# Patient Record
Sex: MALE | Race: WHITE | Employment: OTHER | URBAN - METROPOLITAN AREA
[De-identification: names, ages, dates, MRNs, and addresses within clinical notes are randomized per-mention and may not be internally consistent; named-entity substitution may affect disease eponyms.]

---

## 2017-03-22 PROBLEM — Z78.9 ADVANCE DIRECTIVE IN CHART: Status: ACTIVE | Noted: 2017-03-22

## 2017-05-22 ENCOUNTER — OFFICE VISIT (OUTPATIENT)
Dept: INTERNAL MEDICINE CLINIC | Age: 67
End: 2017-05-22

## 2017-05-22 VITALS
WEIGHT: 187 LBS | OXYGEN SATURATION: 97 % | HEART RATE: 76 BPM | RESPIRATION RATE: 18 BRPM | DIASTOLIC BLOOD PRESSURE: 80 MMHG | BODY MASS INDEX: 26.77 KG/M2 | SYSTOLIC BLOOD PRESSURE: 136 MMHG | TEMPERATURE: 98.6 F | HEIGHT: 70 IN

## 2017-05-22 DIAGNOSIS — Z91.09 ENVIRONMENTAL ALLERGIES: Primary | ICD-10-CM

## 2017-05-22 DIAGNOSIS — J30.9 ALLERGIC RHINITIS, UNSPECIFIED ALLERGIC RHINITIS TRIGGER, UNSPECIFIED RHINITIS SEASONALITY: ICD-10-CM

## 2017-05-22 RX ORDER — FLUTICASONE PROPIONATE 50 MCG
2 SPRAY, SUSPENSION (ML) NASAL DAILY
Qty: 1 BOTTLE | Refills: 1 | Status: SHIPPED | OUTPATIENT
Start: 2017-05-22

## 2017-05-22 RX ORDER — MELOXICAM 7.5 MG/1
TABLET ORAL
COMMUNITY
End: 2021-01-18 | Stop reason: SDUPTHER

## 2017-05-22 NOTE — PATIENT INSTRUCTIONS

## 2017-05-22 NOTE — PROGRESS NOTES
HISTORY OF PRESENT ILLNESS  Sterling Howell is a 78 y.o. male. Patient reports clear nasal drainage with dry cough for 3 days. Patient states he had similar symptoms about 1 month ago and went away with Benadryl. He is unsure if this is allergies or a cold. He denies fever, sore throat, body aches, fatigue. He states he works in the yard a lot and rides a motorcycle. Visit Vitals    /80 (BP 1 Location: Left arm, BP Patient Position: Sitting)    Pulse 76    Temp 98.6 °F (37 °C) (Oral)    Resp 18    Ht 5' 10\" (1.778 m)    Wt 187 lb (84.8 kg)    SpO2 97%    BMI 26.83 kg/m2       Allergies   The history is provided by the patient. This is a new problem. Episode onset: 3 days. The problem occurs constantly. The problem has not changed since onset. He has tried Benadryl for the symptoms. The treatment provided mild relief. Review of Systems   Constitutional: Negative for chills, fever and malaise/fatigue. HENT: Positive for congestion (clear). Respiratory: Positive for cough (at times, dry). Physical Exam   Constitutional: He is oriented to person, place, and time. He appears well-developed and well-nourished. HENT:   Head: Normocephalic. Right Ear: Hearing, tympanic membrane, external ear and ear canal normal.   Left Ear: Hearing, tympanic membrane, external ear and ear canal normal.   Nose: Mucosal edema and rhinorrhea (clear) present. Mouth/Throat: Uvula is midline, oropharynx is clear and moist and mucous membranes are normal.   Neck: Normal range of motion. Neck supple. Cardiovascular: Normal rate, regular rhythm and normal heart sounds. Pulmonary/Chest: Effort normal and breath sounds normal.   Lymphadenopathy:     He has no cervical adenopathy. Neurological: He is alert and oriented to person, place, and time. Skin: Skin is warm and dry. Psychiatric: He has a normal mood and affect. ASSESSMENT and PLAN    ICD-10-CM ICD-9-CM    1.  Environmental allergies Z91.09 V15.09    2.  Allergic rhinitis, unspecified allergic rhinitis trigger, unspecified rhinitis seasonality J30.9 477.9      Orders Placed This Encounter    meloxicam (MOBIC) 7.5 mg tablet    fluticasone (FLONASE) 50 mcg/actuation nasal spray   start OTC zyrtec and flonase as directed

## 2017-05-22 NOTE — PROGRESS NOTES
Chief Complaint   Patient presents with    Croup     x 3 days     Reviewed record in preparation for visit and have obtained necessary documentation. Identified pt with two pt identifiers(name and ). There are no preventive care reminders to display for this patient. Chief Complaint   Patient presents with    Croup     x 3 days        Wt Readings from Last 3 Encounters:   17 187 lb (84.8 kg)   16 180 lb (81.6 kg)   09/15/16 177 lb (80.3 kg)     Temp Readings from Last 3 Encounters:   17 98.6 °F (37 °C) (Oral)   16 98.1 °F (36.7 °C) (Oral)   09/15/16 98.5 °F (36.9 °C) (Oral)     BP Readings from Last 3 Encounters:   17 136/80   16 140/80   09/15/16 131/77     Pulse Readings from Last 3 Encounters:   17 76   16 63   09/15/16 66           Learning Assessment:  :     Learning Assessment 10/10/2014   PRIMARY LEARNER Patient   HIGHEST LEVEL OF EDUCATION - PRIMARY LEARNER  SOME COLLEGE   BARRIERS PRIMARY LEARNER NONE   CO-LEARNER CAREGIVER No   PRIMARY LANGUAGE ENGLISH    NEED No   LEARNER PREFERENCE PRIMARY OTHER (COMMENT)   LEARNING SPECIAL TOPICS no   ANSWERED BY patient   RELATIONSHIP SELF       Depression Screening:  :     PHQ over the last two weeks 2016   Little interest or pleasure in doing things Not at all   Feeling down, depressed or hopeless Not at all   Total Score PHQ 2 0       Fall Risk Assessment:  :     Fall Risk Assessment, last 12 mths 2016   Able to walk? Yes   Fall in past 12 months? No       Abuse Screening:  :     Abuse Screening Questionnaire 10/10/2014   Do you ever feel afraid of your partner? N   Are you in a relationship with someone who physically or mentally threatens you? N   Is it safe for you to go home?  Y       Coordination of Care Questionnaire:  :     1) Have you been to an emergency room, urgent care clinic since your last visit? no   Hospitalized since your last visit? no             2) Have you seen or consulted any other health care providers outside of [address] [place] [address] since your last visit? no  (Include any pap smears or colon screenings in this section.)    3) Do you have an Advance Directive on file? yes    4) Are you interested in receiving information on Advance Directives? NO      Patient is accompanied by self I have received verbal consent from Ailin Infante to discuss any/all medical information while they are present in the room. Reviewed record  In preparation for visit and have obtained necessary documentation.

## 2017-05-22 NOTE — MR AVS SNAPSHOT
Visit Information Date & Time Provider Department Dept. Phone Encounter #  
 5/22/2017 11:15 AM LYNNETTE [facility] [phone] [id number] Your Appointments 12/29/2017  8:20 AM  
COMPLETE 40 with MD Conner Solor[facility] ([facility]) Appt Note: [id number] [facility], Liliane Sanna [address] [place] [address]  
[address], [place]iane Sanna [address] [phone] Upcoming Health Maintenance Date Due INFLUENZA AGE 9 TO ADULT 8/1/2017 MEDICARE YEARLY EXAM 12/30/2017 GLAUCOMA SCREENING Q2Y 4/1/2018 COLONOSCOPY 12/13/2022 DTaP/Tdap/Td series (2 - Td) 1/1/2023 Allergies as of 5/22/2017  Review Complete On: 5/22/2017 By: India Chaudhry NP Severity Noted Reaction Type Reactions Acetaminophen  12/16/2011    Unknown (comments) Does not take because of liver issue Current Immunizations  Reviewed on 12/29/2016 Name Date Influenza High Dose Vaccine PF 10/5/2016 Influenza Vaccine 10/24/2014, 10/19/2013 Influenza Vaccine Whole 10/15/2012 Pneumococcal Conjugate (PCV-13) 1/1/2014 Pneumococcal Vaccine (Unspecified Type) 11/1/2011 TD Vaccine 11/1/2011 Not reviewed this visit You Were Diagnosed With   
  
 Codes Comments Environmental allergies    -  Primary ICD-10-CM: Z91.09 
ICD-9-CM: V15.09 Allergic rhinitis, unspecified allergic rhinitis trigger, unspecified rhinitis seasonality     ICD-10-CM: J30.9 ICD-9-CM: 477.9 Vitals BP Pulse Temp Resp Height(growth percentile) Weight(growth percentile) 136/80 (BP 1 Location: Left arm, BP Patient Position: Sitting) 76 98.6 °F (37 °C) (Oral) 18 5' 10\" (1.778 m) 187 lb (84.8 kg) SpO2 BMI Smoking Status 97% 26.83 kg/m2 Former Smoker BMI and BSA Data Body Mass Index Body Surface Area  
 26.83 kg/m 2 2.05 m 2 Preferred Pharmacy Pharmacy Name Phone [facility][id number]- [address], [address] [facility] [phone] Your Updated Medication List  
  
   
This list is accurate as of: 17 11:43 AM.  Always use your most recent med list. ADVIL 200 mg tablet Generic drug:  ibuprofen Take  by mouth two (2) times a day. fluticasone 50 mcg/actuation nasal spray Commonly known as:  Vishnu Lees 2 Sprays by Both Nostrils route daily. meloxicam 7.5 mg tablet Commonly known as:  MOBIC Take  by mouth daily. multivitamin tablet Commonly known as:  ONE A DAY Take 1 Tab by mouth daily. STAXYN 10 mg Tbdl Generic drug:  vardenafil Take 10 mg by mouth as needed. Prescriptions Sent to Pharmacy Refills  
 fluticasone (FLONASE) 50 mcg/actuation nasal spray 1 Si Sprays by Both Nostrils route daily. Class: Normal  
 Pharmacy: [facility]/pharmacy [address] [place] [address] Ph #: [phone] Route: Both Nostrils Patient Instructions Allergies: Care Instructions Your Care Instructions Allergies occur when your body's defense system (immune system) overreacts to certain substances. The immune system treats a harmless substance as if it were a harmful germ or virus. Many things can cause this overreaction, including pollens, medicine, food, dust, animal dander, and mold. Allergies can be mild or severe. Mild allergies can be managed with home treatment. But medicine may be needed to prevent problems. Managing your allergies is an important part of staying healthy. Your doctor may suggest that you have allergy testing to help find out what is causing your allergies. When you know what things trigger your symptoms, you can avoid them. This can prevent allergy symptoms and other health problems.  
For severe allergies that cause reactions that affect your whole body (anaphylactic reactions), your doctor may prescribe a shot of epinephrine to carry with you in case you have a severe reaction. Learn how to give yourself the shot and keep it with you at all times. Make sure it is not . Follow-up care is a key part of your treatment and safety. Be sure to make and go to all appointments, and call your doctor if you are having problems. It's also a good idea to know your test results and keep a list of the medicines you take. How can you care for yourself at home? · If you have been told by your doctor that dust or dust mites are causing your allergy, decrease the dust around your bed: 
[facility] [employer] sheets, pillowcases, and other bedding in hot water every week. ¨ Use dust-proof covers for pillows, duvets, and mattresses. Avoid plastic covers because they tear easily and do not \"breathe. \" Wash as instructed on the label. ¨ Do not use any blankets and pillows that you do not need. ¨ Use blankets that you can wash in your washing machine. ¨ Consider removing drapes and carpets, which attract and hold dust, from your bedroom. · If you are allergic to house dust and mites, do not use home humidifiers. Your doctor can suggest ways you can control dust and mites. · Look for signs of cockroaches. Cockroaches cause allergic reactions. Use cockroach baits to get rid of them. Then, clean your home well. Cockroaches like areas where grocery bags, newspapers, empty bottles, or cardboard boxes are stored. Do not keep these inside your home, and keep trash and food containers sealed. Seal off any spots where cockroaches might enter your home. · If you are allergic to mold, get rid of furniture, rugs, and drapes that smell musty. Check for mold in the bathroom. · If you are allergic to outdoor pollen or mold spores, use air-conditioning. Change or clean all filters every month. Keep windows closed. · If you are allergic to pollen, stay inside when pollen counts are high.  Use a vacuum  with a HEPA filter or a double-thickness filter at least two times each week. · Stay inside when air pollution is bad. Avoid paint fumes, perfumes, and other strong odors. · Avoid conditions that make your allergies worse. Stay away from smoke. Do not smoke or let anyone else smoke in your house. Do not use fireplaces or wood-burning stoves. · If you are allergic to your pets, change the air filter in your furnace every month. Use high-efficiency filters. · If you are allergic to pet dander, keep pets outside or out of your bedroom. Old carpet and cloth furniture can hold a lot of animal dander. You may need to replace them. When should you call for help? Give an epinephrine shot if: 
· You think you are having a severe allergic reaction. · You have symptoms in more than one body area, such as mild nausea and an itchy mouth. After giving an epinephrine shot call 911, even if you feel better. Call 911 if: 
· You have symptoms of a severe allergic reaction. These may include: 
¨ Sudden raised, red areas (hives) all over your body. ¨ Swelling of the throat, mouth, lips, or tongue. ¨ Trouble breathing. ¨ Passing out (losing consciousness). Or you may feel very lightheaded or suddenly feel weak, confused, or restless. · You have been given an epinephrine shot, even if you feel better. Call your doctor now or seek immediate medical care if: 
· You have symptoms of an allergic reaction, such as: ¨ A rash or hives (raised, red areas on the skin). ¨ Itching. ¨ Swelling. ¨ Belly pain, nausea, or vomiting. Watch closely for changes in your health, and be sure to contact your doctor if: 
· You do not get better as expected. Where can you learn more? Go to http://becca-pavel.info/. Enter H593 in the search box to learn more about \"Allergies: Care Instructions. \" Current as of: February 12, 2016 Content Version: 11.2 © 5371-3766 [employer].  Care instructions adapted under license by [address] (which disclaims liability or warranty for this information). If you have questions about a medical condition or this instruction, always ask your healthcare professional. [address] any warranty or liability for your use of this information. Introducing [facility] & HEALTH SERVICES! Dear Fabián Cody: 
Thank you for requesting a Eyeview account. Our records indicate that you already have an active Eyeview account. You can access your account anytime at [email or web address] Did you know that you can access your hospital and ER discharge instructions at any time in Eyeview? You can also review all of your test results from your hospital stay or ER visit. Additional Information If you have questions, please visit the Frequently Asked Questions section of the Eyeview website at [email or web address] Remember, Eyeview is NOT to be used for urgent needs. For medical emergencies, dial 911. Now available from your iPhone and Android! Please provide this summary of care documentation to your next provider. Your primary care clinician is listed as Olivia Monroy. If you have any questions after today's visit, please call [phone].

## 2017-07-11 ENCOUNTER — HOSPITAL ENCOUNTER (OUTPATIENT)
Dept: CT IMAGING | Age: 67
Discharge: HOME | End: 2017-07-11
Attending: RADIOLOGY
Payer: MEDICARE

## 2017-07-11 ENCOUNTER — HOSPITAL ENCOUNTER (OUTPATIENT)
Dept: NUCLEAR MEDICINE | Age: 67
Discharge: HOME | End: 2017-07-11
Attending: RADIOLOGY
Payer: MEDICARE

## 2017-07-11 DIAGNOSIS — C61 MALIGNANT NEOPLASM OF PROSTATE (HCC): ICD-10-CM

## 2017-07-11 PROCEDURE — 74177 CT ABD & PELVIS W/CONTRAST: CPT

## 2017-07-11 PROCEDURE — 78306 BONE IMAGING WHOLE BODY: CPT

## 2017-07-11 PROCEDURE — 74011636320 HC RX REV CODE- 636/320: Performed by: RADIOLOGY

## 2017-07-11 PROCEDURE — 74011000258 HC RX REV CODE- 258: Performed by: RADIOLOGY

## 2017-07-11 RX ORDER — SODIUM CHLORIDE 0.9 % (FLUSH) 0.9 %
10 SYRINGE (ML) INJECTION
Status: COMPLETED | OUTPATIENT
Start: 2017-07-11 | End: 2017-07-11

## 2017-07-11 RX ADMIN — IOHEXOL 50 ML: 240 INJECTION, SOLUTION INTRATHECAL; INTRAVASCULAR; INTRAVENOUS; ORAL at 10:31

## 2017-07-11 RX ADMIN — Medication 10 ML: at 12:11

## 2017-07-11 RX ADMIN — SODIUM CHLORIDE 100 ML: 900 INJECTION, SOLUTION INTRAVENOUS at 12:11

## 2017-07-11 RX ADMIN — IOPAMIDOL 100 ML: 755 INJECTION, SOLUTION INTRAVENOUS at 12:11

## 2017-09-18 ENCOUNTER — OFFICE VISIT (OUTPATIENT)
Dept: HEMATOLOGY | Age: 67
End: 2017-09-18

## 2017-09-18 VITALS
BODY MASS INDEX: 25.83 KG/M2 | HEART RATE: 70 BPM | WEIGHT: 180 LBS | OXYGEN SATURATION: 97 % | TEMPERATURE: 98.7 F | DIASTOLIC BLOOD PRESSURE: 82 MMHG | SYSTOLIC BLOOD PRESSURE: 150 MMHG

## 2017-09-18 DIAGNOSIS — B18.2 CHRONIC HEPATITIS C WITHOUT HEPATIC COMA (HCC): Primary | ICD-10-CM

## 2017-09-18 DIAGNOSIS — R76.8 HCV ANTIBODY POSITIVE: ICD-10-CM

## 2017-09-18 LAB
ERYTHROCYTE [DISTWIDTH] IN BLOOD BY AUTOMATED COUNT: 13.1 % (ref 12.3–15.4)
HCT VFR BLD AUTO: 43.5 % (ref 37.5–51)
HGB BLD-MCNC: 14.9 G/DL (ref 12.6–17.7)
MCH RBC QN AUTO: 31.5 PG (ref 26.6–33)
MCHC RBC AUTO-ENTMCNC: 34.3 G/DL (ref 31.5–35.7)
MCV RBC AUTO: 92 FL (ref 79–97)
PLATELET # BLD AUTO: 176 X10E3/UL (ref 150–379)
RBC # BLD AUTO: 4.73 X10E6/UL (ref 4.14–5.8)
WBC # BLD AUTO: 6.5 X10E3/UL (ref 3.4–10.8)

## 2017-09-18 NOTE — MR AVS SNAPSHOT
Visit Information Date & Time Provider Department Dept. Phone Encounter #  
 9/18/2017 10:00 AM April G Eladio Manley, [address] [facility] 219 [id number] Your Appointments 1/2/2018 11:20 AM  
ROUTINE CARE with MD Glenroy Esparzava [facility] ([facility]) Appt Note: AWV [id number]; AWV [id number] resched from [address] [facility], [address] [place] [address]  
[address], [place] Sanna [address] [phone] Upcoming Health Maintenance Date Due INFLUENZA AGE 9 TO ADULT 8/1/2017 MEDICARE YEARLY EXAM 12/30/2017 GLAUCOMA SCREENING Q2Y 4/1/2018 COLONOSCOPY 12/13/2022 DTaP/Tdap/Td series (2 - Td) 1/1/2023 Allergies as of 9/18/2017  Review Complete On: 9/18/2017 By: Matilde Grewal NP Severity Noted Reaction Type Reactions Acetaminophen  12/16/2011    Unknown (comments) Does not take because of liver issue Current Immunizations  Reviewed on 12/29/2016 Name Date Influenza High Dose Vaccine PF 10/5/2016 Influenza Vaccine 10/24/2014, 10/19/2013 Influenza Vaccine Whole 10/15/2012 Pneumococcal Conjugate (PCV-13) 1/1/2014 TD Vaccine 11/1/2011 ZZZ-RETIRED (DO NOT USE) Pneumococcal Vaccine (Unspecified Type) 11/1/2011 Not reviewed this visit You Were Diagnosed With   
  
 Codes Comments Chronic hepatitis C without hepatic coma (HCC)    -  Primary ICD-10-CM: B18.2 ICD-9-CM: 070.54 Vitals BP Pulse Temp Weight(growth percentile) SpO2 BMI  
 150/82 70 98.7 °F (37.1 °C) (Oral) 180 lb (81.6 kg) 97% 25.83 kg/m2 Smoking Status Former Smoker BMI and BSA Data Body Mass Index Body Surface Area  
 25.83 kg/m 2 2.01 m 2 Preferred Pharmacy Pharmacy Name Phone [id number]- [place], [address] [place] [phone] Your Updated Medication List  
  
   
 This list is accurate as of: 9/18/17 10:34 AM.  Always use your most recent med list. ADVIL 200 mg tablet Generic drug:  ibuprofen Take  by mouth two (2) times a day. fluticasone 50 mcg/actuation nasal spray Commonly known as:  Nisha Hoot 2 Sprays by Both Nostrils route daily. meloxicam 7.5 mg tablet Commonly known as:  MOBIC Take  by mouth daily. multivitamin tablet Commonly known as:  ONE A DAY Take 1 Tab by mouth daily. STAXYN 10 mg Tbdi Generic drug:  vardenafil Take 10 mg by mouth as needed. We Performed the Following CBC W/O DIFF [36047 CPT(R)] HCV RNA BY JULISSA QL,RFLX TO QT [91058 CPT(R)] METABOLIC PANEL, COMPREHENSIVE [78749 CPT(R)] Introducing [facility] & HEALTH SERVICES! Dear Enrike Brock: 
Thank you for requesting a CENX account. Our records indicate that you already have an active CENX account. You can access your account anytime at [email or web address] Did you know that you can access your hospital and ER discharge instructions at any time in CENX? You can also review all of your test results from your hospital stay or ER visit. Additional Information If you have questions, please visit the Frequently Asked Questions section of the CENX website at [email or web address] Remember, CENX is NOT to be used for urgent needs. For medical emergencies, dial 911. Now available from your iPhone and Android! Please provide this summary of care documentation to your next provider. Your primary care clinician is listed as Olivia Monroy. If you have any questions after today's visit, please call [phone].

## 2017-09-19 LAB
ALBUMIN SERPL-MCNC: 4.5 G/DL (ref 3.6–4.8)
ALBUMIN/GLOB SERPL: 1.9 {RATIO} (ref 1.2–2.2)
ALP SERPL-CCNC: 27 IU/L (ref 39–117)
ALT SERPL-CCNC: 16 IU/L (ref 0–44)
AST SERPL-CCNC: 22 IU/L (ref 0–40)
BILIRUB SERPL-MCNC: 0.4 MG/DL (ref 0–1.2)
BUN SERPL-MCNC: 18 MG/DL (ref 8–27)
BUN/CREAT SERPL: 21 (ref 10–24)
CALCIUM SERPL-MCNC: 9.4 MG/DL (ref 8.6–10.2)
CHLORIDE SERPL-SCNC: 102 MMOL/L (ref 96–106)
CO2 SERPL-SCNC: 27 MMOL/L (ref 18–29)
CREAT SERPL-MCNC: 0.84 MG/DL (ref 0.76–1.27)
GLOBULIN SER CALC-MCNC: 2.4 G/DL (ref 1.5–4.5)
GLUCOSE SERPL-MCNC: 80 MG/DL (ref 65–99)
HCV RNA SERPL QL NAA+PROBE: NEGATIVE
POTASSIUM SERPL-SCNC: 4.9 MMOL/L (ref 3.5–5.2)
PROT SERPL-MCNC: 6.9 G/DL (ref 6–8.5)
SODIUM SERPL-SCNC: 142 MMOL/L (ref 134–144)
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
## 2017-09-20 PROBLEM — R76.8 HCV ANTIBODY POSITIVE: Status: ACTIVE | Noted: 2017-09-20

## 2018-01-02 ENCOUNTER — OFFICE VISIT (OUTPATIENT)
Dept: INTERNAL MEDICINE CLINIC | Age: 68
End: 2018-01-02

## 2018-01-02 VITALS
TEMPERATURE: 98 F | WEIGHT: 186 LBS | OXYGEN SATURATION: 97 % | DIASTOLIC BLOOD PRESSURE: 88 MMHG | HEIGHT: 70 IN | HEART RATE: 73 BPM | BODY MASS INDEX: 26.63 KG/M2 | SYSTOLIC BLOOD PRESSURE: 144 MMHG | RESPIRATION RATE: 15 BRPM

## 2018-01-02 DIAGNOSIS — Z00.00 MEDICARE ANNUAL WELLNESS VISIT, SUBSEQUENT: Primary | ICD-10-CM

## 2018-01-02 DIAGNOSIS — C61 PROSTATE CANCER (HCC): ICD-10-CM

## 2018-01-02 PROBLEM — Z78.9 ADVANCE DIRECTIVE IN CHART: Chronic | Status: ACTIVE | Noted: 2017-03-22

## 2018-01-02 RX ORDER — CETIRIZINE HCL 10 MG
TABLET ORAL
COMMUNITY

## 2018-01-02 NOTE — PROGRESS NOTES
Patient's identity verified with two patient identifiers (name and date of birth). 1. Have you been to the ER, urgent care clinic since your last visit? Hospitalized since your last visit? No  2. Have you seen or consulted any other health care providers outside of the [address] [place] [address] since your last visit? Include any pap smears or colon screening. No    Chief Complaint   Patient presents with    Annual Wellness Visit     Not fasting. Not fasting. Health Maintenance Due   Topic Date Due    Influenza Age 5 to Adult   Done per patient, 10/2017 08/01/2017    MEDICARE YEARLY EXAM   Done today. 12/30/2017     Reviewed record in preparation for visit and have obtained necessary documentation. Wt Readings from Last 3 Encounters:   01/02/18 186 lb (84.4 kg)   09/18/17 180 lb (81.6 kg)   05/22/17 187 lb (84.8 kg)     Temp Readings from Last 3 Encounters:   01/02/18 98 °F (36.7 °C) (Oral)   09/18/17 98.7 °F (37.1 °C) (Oral)   05/22/17 98.6 °F (37 °C) (Oral)     BP Readings from Last 3 Encounters:   01/02/18 144/88   09/18/17 150/82   05/22/17 136/80     Pulse Readings from Last 3 Encounters:   01/02/18 73   09/18/17 70   05/22/17 76     Abuse Screening:  :     Abuse Screening Questionnaire 1/2/2018 10/10/2014   Do you ever feel afraid of your partner? N N   Are you in a relationship with someone who physically or mentally threatens you? N N   Is it safe for you to go home? Y Y     ADL screening performed, no help needed with all categories. Patient is accompanied by wife I have [facility] verbal [facility] to discuss any/all medical information while they are present in the room.
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
## 2018-01-02 NOTE — MR AVS SNAPSHOT
Visit Information Date & Time Provider Department Dept. Phone Encounter #  
 1/2/2018 11:20 AM Christel Perez MD [facility] [phone] Follow-up Instructions Return in about 1 year (around 1/2/2019) for wellness exam. Upcoming Health Maintenance Date Due  
 GLAUCOMA SCREENING Q2Y 4/1/2018 MEDICARE YEARLY EXAM 1/3/2019 COLONOSCOPY 12/13/2022 DTaP/Tdap/Td series (2 - Td) 1/1/2023 Allergies as of 1/2/2018  Review Complete On: 1/2/2018 By: Christel Perez MD  
  
 Severity Noted Reaction Type Reactions Acetaminophen  12/16/2011    Unknown (comments) Does not take because of liver issue Current Immunizations  Reviewed on 1/2/2018 Name Date Influenza High Dose Vaccine PF 10/2/2017, 10/5/2016 Influenza Vaccine 10/24/2014, 10/19/2013 Influenza Vaccine Whole 10/15/2012 Pneumococcal Conjugate (PCV-13) 1/1/2014 TD Vaccine 11/1/2011 ZZZ-RETIRED (DO NOT USE) Pneumococcal Vaccine (Unspecified Type) 11/1/2011 Reviewed by Christel Perez MD on 1/2/2018 at 11:37 AM  
You Were Diagnosed With   
  
 Codes Comments Medicare annual wellness visit, subsequent    -  Primary ICD-10-CM: Z00.00 ICD-9-CM: V70.0 Vitals BP Pulse Temp Resp Height(growth percentile) Weight(growth percentile) 144/88 (BP 1 Location: Left arm, BP Patient Position: Sitting) 73 98 °F (36.7 °C) (Oral) 15 5' 10\" (1.778 m) 186 lb (84.4 kg) SpO2 BMI Smoking Status 97% 26.69 kg/m2 Former Smoker Vitals History BMI and BSA Data Body Mass Index Body Surface Area  
 26.69 kg/m 2 2.04 m 2 Preferred Pharmacy Pharmacy Name Phone [id number] [place], [address] [place] [phone] Your Updated Medication List  
  
   
This list is accurate as of: 1/2/18 11:59 AM.  Always use your most recent med list. ADVIL 200 mg tablet Generic drug:  ibuprofen Take  by mouth two (2) times a day. fluticasone 50 mcg/actuation nasal spray Commonly known as:  Lauren Abelardo 2 Sprays by Both Nostrils route daily. meloxicam 7.5 mg tablet Commonly known as:  MOBIC Take  by mouth daily. multivitamin tablet Commonly known as:  ONE A DAY Take 1 Tab by mouth daily. STAXYN 10 mg Tbdi Generic drug:  vardenafil Take 10 mg by mouth as needed. ZyrTEC 10 mg tablet Generic drug:  cetirizine Take  by mouth daily as needed for Allergies. Follow-up Instructions Return in about 1 year (around 1/2/2019) for wellness exam.  
  
  
Patient Instructions Medicare Wellness Visit, Male The best way to live healthy is to have a healthy lifestyle by eating a well-balanced diet, exercising regularly, limiting alcohol and stopping smoking. Regular physical exams and screening tests are another way to keep healthy. Preventive exams provided by your health care provider can find health problems before they become diseases or illnesses. Preventive services including immunizations, screening tests, monitoring and exams can help you take care of your own health. All people over age 72 should have a pneumovax  and and a prevnar shot to prevent pneumonia. These are once in a lifetime unless you and your provider decide differently. All people over 65 should have a yearly flu shot and a tetanus vaccine every 10 years. Screening for diabetes mellitus with a blood sugar test should be done every year. Glaucoma is a disease of the eye due to increased ocular pressure that can lead to blindness and it should be done every year by an eye professional. 
 
Cardiovascular screening tests that check for elevated lipids (fatty part of blood) which can lead to heart disease and strokes should be done every 5 years.  
 
Colorectal screening that evaluates for blood or polyps in your colon should be done yearly as a stool test or every five years as a flexible sigmoidoscope or every 10 years as a colonoscopy up to age 76. Men up to age 76 may need a screening blood test for prostate cancer at certain intervals, depending on their personal and family history. This decision is between the patient and his provider. If you have been a smoker or had family history of abdominal aortic aneurysms, you and your provider may decide to schedule an ultrasound test of your aorta. Hepatitis C screening is also recommended for anyone born between [birth year] through [address]. A shingles vaccine is also recommended once in a lifetime after age 61. Your Medicare Wellness Exam is recommended annually. Here is a list of your current Health Maintenance items with a due date: 
Health Maintenance Due Topic Date Due  
 Annual Well Visit  12/30/2017 Introducing [facility]! Dear Rosana Vigil: 
Thank you for requesting a [employer] account. Our records indicate that you already have an active [employer] account. You can access your account anytime at [email or web address] Did you know that you can access your hospital and ER discharge instructions at any time in [employer]? You can also review all of your test results from your hospital stay or ER visit. Additional Information If you have questions, please visit the Frequently Asked Questions section of the [employer] website at [email or web address] Remember, [employer] is NOT to be used for urgent needs. For medical emergencies, dial 911. Now available from your iPhone and Android! Please provide this summary of care documentation to your next provider. Your primary care clinician is listed as Olivia Monroy. If you have any questions after today's visit, please call [phone].

## 2018-01-02 NOTE — PATIENT INSTRUCTIONS

## 2018-01-03 NOTE — ASSESSMENT & PLAN NOTE
This condition is managed by Specialist.  Key Oncology Meds     Patient is on no Oncologic meds. Key Pain Meds             meloxicam (MOBIC) 7.5 mg tablet  (Taking) Take  by mouth daily. ibuprofen (ADVIL) 200 mg tablet  (Taking) Take  by mouth two (2) times a day. Lab Results   Component Value Date/Time    WBC 6.5 09/18/2017 12:00 PM    ABS.  NEUTROPHILS 5.6 12/29/2016 09:17 AM    HGB 14.9 09/18/2017 12:00 PM    HCT 43.5 09/18/2017 12:00 PM    PLATELET 303 40/73/9617 12:00 PM    Creatinine 0.84 09/18/2017 12:00 PM    BUN 18 09/18/2017 12:00 PM    ALT (SGPT) 16 09/18/2017 12:00 PM    AST (SGOT) 22 09/18/2017 12:00 PM    Albumin 4.5 09/18/2017 12:00 PM

## 2018-01-31 ENCOUNTER — TELEPHONE (OUTPATIENT)
Dept: INTERNAL MEDICINE CLINIC | Age: 68
End: 2018-01-31

## 2018-01-31 NOTE — TELEPHONE ENCOUNTER
----- Message from Leigh Ann Farias sent at 1/31/2018 10:15 AM EST -----  Regarding: Dr. Winona Cowden  Pt request to have practice manager give him a call in reference to Medicare bill, best contact number [phone].

## 2018-08-06 ENCOUNTER — TELEPHONE (OUTPATIENT)
Dept: INTERNAL MEDICINE CLINIC | Age: 68
End: 2018-08-06

## 2018-08-06 ENCOUNTER — OFFICE VISIT (OUTPATIENT)
Dept: INTERNAL MEDICINE CLINIC | Age: 68
End: 2018-08-06

## 2018-08-06 ENCOUNTER — HOSPITAL ENCOUNTER (OUTPATIENT)
Dept: GENERAL RADIOLOGY | Age: 68
Discharge: HOME | End: 2018-08-06
Payer: MEDICARE

## 2018-08-06 VITALS
BODY MASS INDEX: 25.64 KG/M2 | TEMPERATURE: 97.8 F | DIASTOLIC BLOOD PRESSURE: 80 MMHG | RESPIRATION RATE: 18 BRPM | HEIGHT: 70 IN | WEIGHT: 179.1 LBS | HEART RATE: 75 BPM | SYSTOLIC BLOOD PRESSURE: 130 MMHG | OXYGEN SATURATION: 95 %

## 2018-08-06 DIAGNOSIS — M89.8X1 PAIN OF RIGHT CLAVICLE: ICD-10-CM

## 2018-08-06 DIAGNOSIS — M89.8X1 PAIN OF RIGHT CLAVICLE: Primary | ICD-10-CM

## 2018-08-06 DIAGNOSIS — M25.511 ACUTE PAIN OF RIGHT SHOULDER: ICD-10-CM

## 2018-08-06 PROCEDURE — 71046 X-RAY EXAM CHEST 2 VIEWS: CPT

## 2018-08-06 RX ORDER — METHYLPREDNISOLONE 4 MG/1
TABLET ORAL
Qty: 1 DOSE PACK | Refills: 0 | Status: SHIPPED | OUTPATIENT
Start: 2018-08-06 | End: 2018-11-26

## 2018-08-06 NOTE — PROGRESS NOTES
HISTORY OF PRESENT ILLNESS  Radha Mcdermott is a 75 y.o. male. Patient reports pain above right clavicle x 3 weeks since moving a heavy object. Pain started by the next day. It seems to be getting better, but not going away. He is worried with his history of prostate cancer that it could be cancer causing this persistent pain. Patient has taken NSAIDS, which do provide relief. Pain is worse with range of motion of neck and right shoulder. No decrease in strength. Patient will be out of town all next week and is concerned about being in pain. Visit Vitals    /80 (BP 1 Location: Left arm, BP Patient Position: Sitting)    Pulse 75    Temp 97.8 °F (36.6 °C) (Oral)    Resp 18    Ht 5' 10\" (1.778 m)    Wt 179 lb 1.6 oz (81.2 kg)    SpO2 95%    BMI 25.7 kg/m2       Shoulder Pain    The history is provided by the patient. The incident occurred more than 1 week ago. The right shoulder is affected. Review of Systems   Musculoskeletal:        Pain near right shoulder       Physical Exam   Constitutional: He is oriented to person, place, and time. He appears well-developed and well-nourished. Musculoskeletal:        Right shoulder: He exhibits tenderness (above right clavicle; worse with any range of motion, increased with left lateral flexion of neck) and pain. He exhibits normal range of motion, no bony tenderness and no swelling. Neurological: He is alert and oriented to person, place, and time. Skin: Skin is warm and dry. Psychiatric: He has a normal mood and affect. ASSESSMENT and PLAN    ICD-10-CM ICD-9-CM    1.  Pain of right clavicle M89.8X1 733.90 XR CHEST PA LAT      REFERRAL TO PHYSICAL THERAPY   2. Acute pain of right shoulder M25.511 719.41 XR CHEST PA LAT      REFERRAL TO PHYSICAL THERAPY     Orders Placed This Encounter    XR CHEST PA LAT    REFERRAL TO PHYSICAL THERAPY    methylPREDNISolone (MEDROL DOSEPACK) 4 mg tablet   may take medrol dose pack next week if needed for relief  Try to start PT this week and see if stretching and exercises can provide relief without needing medrol  Xray ordered

## 2018-08-06 NOTE — PROGRESS NOTES
Reviewed record in preparation for visit and have obtained necessary documentation. Identified pt with two pt identifiers(name and ). Chief Complaint   Patient presents with    Shoulder Pain     Pateinted stated right shoulder pain x 3 weeks. Health Maintenance Due   Topic Date Due    Glaucoma Screening   2018    Flu Vaccine  2018       Coordination of Care Questionnaire:  :     1) Have you been to an emergency room, urgent care clinic since your last visit? no   Hospitalized since your last visit? no             2) Have you seen or consulted any other health care providers outside of [address] [place] [address] since your last visit? yes Vet.  Administration   (Include any pap smears or colon screenings in this section.)

## 2018-11-26 ENCOUNTER — OFFICE VISIT (OUTPATIENT)
Dept: INTERNAL MEDICINE CLINIC | Age: 68
End: 2018-11-26

## 2018-11-26 VITALS
DIASTOLIC BLOOD PRESSURE: 94 MMHG | BODY MASS INDEX: 25.91 KG/M2 | HEIGHT: 70 IN | OXYGEN SATURATION: 98 % | HEART RATE: 71 BPM | TEMPERATURE: 98.8 F | RESPIRATION RATE: 15 BRPM | WEIGHT: 181 LBS | SYSTOLIC BLOOD PRESSURE: 154 MMHG

## 2018-11-26 DIAGNOSIS — C61 PROSTATE CANCER (HCC): ICD-10-CM

## 2018-11-26 DIAGNOSIS — M79.604 RIGHT LEG PAIN: Primary | ICD-10-CM

## 2018-11-26 DIAGNOSIS — M19.90 ARTHRITIS: ICD-10-CM
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
## 2018-11-26 NOTE — PATIENT INSTRUCTIONS
Have your right leg doppler ultrasound done tomorrow- call [phone] to schedule    Schedule your bone scan and CT: Abdomen/Pelvis
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
## 2018-11-27 ENCOUNTER — HOSPITAL ENCOUNTER (OUTPATIENT)
Dept: VASCULAR SURGERY | Age: 68
Discharge: HOME | End: 2018-11-27
Attending: NURSE PRACTITIONER
Payer: MEDICARE

## 2018-11-27 ENCOUNTER — TELEPHONE (OUTPATIENT)
Dept: INTERNAL MEDICINE CLINIC | Age: 68
End: 2018-11-27

## 2018-11-27 DIAGNOSIS — C61 PROSTATE CANCER (HCC): ICD-10-CM

## 2018-11-27 DIAGNOSIS — M79.604 RIGHT LEG PAIN: ICD-10-CM

## 2018-11-27 PROCEDURE — 93971 EXTREMITY STUDY: CPT

## 2018-11-27 NOTE — PROCEDURES
[facility]  *** FINAL REPORT ***    Name: Shayy Mac  MRN: [id number]    Outpatient  : 07 Oct 1950  HIS Order #: [id number]  [id number] [facility] Visit #: [id number]  Date: 2018    TYPE OF TEST: Peripheral Venous Testing    REASON FOR TEST  Pain in limb, Hx of ca    Right Leg:-  Deep venous thrombosis:           No  Superficial venous thrombosis:    No  Deep venous insufficiency:        Not examined  Superficial venous insufficiency: Not examined      INTERPRETATION/FINDINGS  PROCEDURE:  Color duplex ultrasound imaging of lower extremity veins. FINDINGS:       Right: The common femoral, deep femoral, femoral, popliteal,  posterior tibial, peroneal, and great saphenous are patent and without   evidence of thrombus; each is is fully compressible and there is no  narrowing of the flow channel on color Doppler imaging. Phasic flow  is observed in the common femoral vein. Left:   The common femoral vein is patent and without evidence of   thrombus. Phasic flow is observed. This extremity was not otherwise   evaluated. IMPRESSION:  No evidence of right lower extremity vein thrombosis. ADDITIONAL COMMENTS    I have personally reviewed the data relevant to the interpretation of  this  study.     TECHNOLOGIST: Swathi López RVT  Signed: 2018 10:01 AM    PHYSICIAN: Megan Gee MD  Signed: 2018 07:43 AM

## 2018-11-27 NOTE — PROGRESS NOTES
His Ultrasound was negative for DVT. I called and left a message that I sent his results via [employer]. Awaiting other scans to be completed.

## 2018-11-27 NOTE — TELEPHONE ENCOUNTER
Left message on voicemail that I will [phone] his results of his ultrasound. It was negative for DVT.

## 2018-11-29 ENCOUNTER — HOSPITAL ENCOUNTER (OUTPATIENT)
Dept: NUCLEAR MEDICINE | Age: 68
Discharge: HOME | End: 2018-11-29
Attending: NURSE PRACTITIONER
Payer: MEDICARE

## 2018-11-29 ENCOUNTER — HOSPITAL ENCOUNTER (OUTPATIENT)
Dept: CT IMAGING | Age: 68
Discharge: HOME | End: 2018-11-29
Attending: NURSE PRACTITIONER
Payer: MEDICARE

## 2018-11-29 DIAGNOSIS — C61 PROSTATE CANCER (HCC): ICD-10-CM

## 2018-11-29 PROCEDURE — 74011636320 HC RX REV CODE- 636/320: Performed by: RADIOLOGY

## 2018-11-29 PROCEDURE — 74177 CT ABD & PELVIS W/CONTRAST: CPT

## 2018-11-29 PROCEDURE — 74011000258 HC RX REV CODE- 258: Performed by: RADIOLOGY

## 2018-11-29 PROCEDURE — 78306 BONE IMAGING WHOLE BODY: CPT

## 2018-11-29 RX ORDER — SODIUM CHLORIDE 0.9 % (FLUSH) 0.9 %
10 SYRINGE (ML) INJECTION
Status: COMPLETED | OUTPATIENT
Start: 2018-11-29 | End: 2018-11-29

## 2018-11-29 RX ADMIN — Medication 10 ML: at 13:30

## 2018-11-29 RX ADMIN — SODIUM CHLORIDE 100 ML: 900 INJECTION, SOLUTION INTRAVENOUS at 13:30

## 2018-11-29 RX ADMIN — IOPAMIDOL 100 ML: 755 INJECTION, SOLUTION INTRAVENOUS at 13:30

## 2018-11-29 RX ADMIN — IOHEXOL 50 ML: 240 INJECTION, SOLUTION INTRATHECAL; INTRAVASCULAR; INTRAVENOUS; ORAL at 13:30

## 2019-02-19 ENCOUNTER — HOSPITAL ENCOUNTER (OUTPATIENT)
Dept: CT IMAGING | Age: 69
Discharge: HOME | End: 2019-02-19
Payer: MEDICARE

## 2019-02-19 DIAGNOSIS — C61 PROSTATE CANCER (HCC): ICD-10-CM

## 2019-02-19 LAB — CREAT BLD-MCNC: 0.8 MG/DL (ref 0.6–1.3)

## 2019-02-19 PROCEDURE — 71260 CT THORAX DX C+: CPT

## 2019-02-19 PROCEDURE — 74011636320 HC RX REV CODE- 636/320: Performed by: RADIOLOGY

## 2019-02-19 PROCEDURE — 74011000258 HC RX REV CODE- 258: Performed by: RADIOLOGY

## 2019-02-19 PROCEDURE — 82565 ASSAY OF CREATININE: CPT

## 2019-02-19 PROCEDURE — 74177 CT ABD & PELVIS W/CONTRAST: CPT

## 2019-02-19 RX ORDER — SODIUM CHLORIDE 0.9 % (FLUSH) 0.9 %
10 SYRINGE (ML) INJECTION
Status: COMPLETED | OUTPATIENT
Start: 2019-02-19 | End: 2019-02-19

## 2019-02-19 RX ADMIN — Medication 10 ML: at 15:21

## 2019-02-19 RX ADMIN — IOPAMIDOL 100 ML: 755 INJECTION, SOLUTION INTRAVENOUS at 15:21

## 2019-02-19 RX ADMIN — IOHEXOL 50 ML: 240 INJECTION, SOLUTION INTRATHECAL; INTRAVASCULAR; INTRAVENOUS; ORAL at 15:35

## 2019-02-19 RX ADMIN — SODIUM CHLORIDE 100 ML: 900 INJECTION, SOLUTION INTRAVENOUS at 15:21

## 2019-03-01 ENCOUNTER — OFFICE VISIT (OUTPATIENT)
Dept: FAMILY MEDICINE CLINIC | Age: 69
End: 2019-03-01

## 2019-03-01 VITALS
BODY MASS INDEX: 27.49 KG/M2 | RESPIRATION RATE: 16 BRPM | OXYGEN SATURATION: 96 % | HEIGHT: 70 IN | TEMPERATURE: 98.2 F | DIASTOLIC BLOOD PRESSURE: 80 MMHG | SYSTOLIC BLOOD PRESSURE: 150 MMHG | WEIGHT: 192 LBS | HEART RATE: 68 BPM

## 2019-03-01 DIAGNOSIS — J00 ACUTE NASOPHARYNGITIS: Primary | ICD-10-CM

## 2019-03-01 NOTE — PROGRESS NOTES
Chief Complaint   Patient presents with    Cold Symptoms     Cough, chest congestion, fever of 99.1, symptoms for three days

## 2019-03-01 NOTE — PROGRESS NOTES
HISTORY OF PRESENT ILLNESS  Evelin Demarco is a 70 y.o. male. Patient reports cough, clear congestion, temp of 99.1 yesterday, mild bilateral ear pain, and some body aches x 3 days. Patient has been using flonase with mild relief. Visit Vitals  /80   Pulse 68   Temp 98.2 °F (36.8 °C) (Oral)   Resp 16   Ht 5' 10\" (1.778 m)   Wt 192 lb (87.1 kg)   SpO2 96%   BMI 27.55 kg/m²       HPI    Review of Systems   Constitutional: Positive for fever and malaise/fatigue. HENT: Positive for congestion and ear pain. Respiratory: Positive for cough. Musculoskeletal: Positive for myalgias. Physical Exam   Constitutional: He is oriented to person, place, and time. He appears well-developed and well-nourished. HENT:   Head: Normocephalic. Right Ear: Hearing, tympanic membrane, external ear and ear canal normal.   Left Ear: Hearing, tympanic membrane, external ear and ear canal normal.   Nose: Mucosal edema and rhinorrhea present. Mouth/Throat: Uvula is midline, oropharynx is clear and moist and mucous membranes are normal.   Neck: Normal range of motion. Neck supple. Cardiovascular: Normal rate, regular rhythm and normal heart sounds. Pulmonary/Chest: Effort normal and breath sounds normal.   Neurological: He is alert and oriented to person, place, and time. Skin: Skin is warm and dry. Psychiatric: He has a normal mood and affect. ASSESSMENT and PLAN    ICD-10-CM ICD-9-CM    1. Acute nasopharyngitis J00 [id number]      No orders of the defined types were placed in this encounter.   declines flu test  Add mucinex  Motrin as needed for fever/body aches  humidifer at night  Hydrate  Consider antibiotic if no improvement in the next week or fever persists

## 2019-03-04 ENCOUNTER — OFFICE VISIT (OUTPATIENT)
Dept: INTERNAL MEDICINE CLINIC | Age: 69
End: 2019-03-04

## 2019-03-04 VITALS
DIASTOLIC BLOOD PRESSURE: 74 MMHG | BODY MASS INDEX: 26.77 KG/M2 | WEIGHT: 187 LBS | OXYGEN SATURATION: 97 % | SYSTOLIC BLOOD PRESSURE: 128 MMHG | HEART RATE: 85 BPM | TEMPERATURE: 98 F | HEIGHT: 70 IN | RESPIRATION RATE: 16 BRPM

## 2019-03-04 DIAGNOSIS — R50.9 FEVER, UNSPECIFIED FEVER CAUSE: ICD-10-CM

## 2019-03-04 DIAGNOSIS — J06.9 UPPER RESPIRATORY TRACT INFECTION, UNSPECIFIED TYPE: Primary | ICD-10-CM

## 2019-03-04 DIAGNOSIS — H66.90 ACUTE OTITIS MEDIA, UNSPECIFIED OTITIS MEDIA TYPE: ICD-10-CM

## 2019-03-04 LAB
FLUAV+FLUBV AG NOSE QL IA.RAPID: NEGATIVE POS/NEG
FLUAV+FLUBV AG NOSE QL IA.RAPID: NEGATIVE POS/NEG
VALID INTERNAL CONTROL?: YES

## 2019-03-04 RX ORDER — AZITHROMYCIN 250 MG/1
250 TABLET, FILM COATED ORAL SEE ADMIN INSTRUCTIONS
Qty: 6 TAB | Refills: 0 | Status: SHIPPED | OUTPATIENT
Start: 2019-03-04 | End: 2019-03-09

## 2019-03-04 NOTE — PROGRESS NOTES
Patient's identity verified with two patient identifiers (name and date of birth). Reviewed record in preparation for visit and have obtained necessary documentation. 1. Have you been to the ER, urgent care clinic since your last visit? Hospitalized since your last visit? No  2. Have you seen or consulted any other health care providers outside of the [address] [place] [address] since your last visit? Include any pap smears or colon screening. No    Chief Complaint   Patient presents with    Cough     Dry cough, congestion in chest & nasal, mild fever x5d. Seen by Aristides Meeks 3/1/19, was told to come back if sx did not improve: \"since Friday, has gotten worse\". Requesting flu test, had flu. Hot shower w/ \"loosen things up\". Taking Flonase, zyrtec, mucinex- some relief. Not fasting.     Health Maintenance Due   Topic Date Due    Shingrix Vaccine Age 49> (1 of 2) 10/07/1999    Pneumococcal 65+ Low/Medium Risk (2 of 2 - PPSV23) 11/01/2016    GLAUCOMA SCREENING Q2Y  04/01/2018    MEDICARE YEARLY EXAM  01/03/2019       Wt Readings from Last 3 Encounters:   03/04/19 187 lb (84.8 kg)   03/01/19 192 lb (87.1 kg)   11/26/18 181 lb (82.1 kg)     Temp Readings from Last 3 Encounters:   03/04/19 98 °F (36.7 °C) (Oral)   03/01/19 98.2 °F (36.8 °C) (Oral)   11/26/18 98.8 °F (37.1 °C) (Oral)     BP Readings from Last 3 Encounters:   03/04/19 128/74   03/01/19 150/80   11/26/18 (!) 154/94     Pulse Readings from Last 3 Encounters:   03/04/19 85   03/01/19 68   11/26/18 71

## 2019-03-04 NOTE — PROGRESS NOTES
Results for orders placed or performed in visit on 03/04/19   AMB POC WILLIAM INFLUENZA A/B TEST   Result Value Ref Range    VALID INTERNAL CONTROL POC Yes     Influenza A Ag POC Negative Negative Pos/Neg    Influenza B Ag POC Negative Negative Pos/Neg

## 2019-03-04 NOTE — PATIENT INSTRUCTIONS
1. Mucinex (Guaifenesen) plain-Blue Box 600 mg. Take one twice daily with full glass water   Take for 10 days    2. Saline Nasal Spray - use liberally to flush post-nasal area; use as many times a day as desired. Keep spraying with head tilted back until you feel need to swallow    3. Drink lots of fluids (mainly water) to keep mucus thinner    4. If needed, for cough, we recommend Delsym cough syrup    5. Long acting antihistamine (Allegra/Fexofenadine or Zyrtec/Ceterizine) is useful if allergy symptoms are also present    6. Decongestants should only be used for about 3 days. After that, they actually contribute to overdrying/thickening of the mucus, and can raise the BP and overstimulate the heart    7.  Steroid nasal spray (Nasacort or Flonase) - 2 sprays each nostril once daily; use with head in upright position

## 2019-03-04 NOTE — PROGRESS NOTES
71 yo male seen at [facility] on 3/1 for URI. He has been taking Flonase and Mucinex Max Strength. He feels the congestion is worsening, and he has a dry cough. He has had temp up to 99s. He received his high dose flu vaccine at the [place] in fall 2018. He has Prostate Cancer and is followed at [facility]. He is currently being trying to get accepted into a Clinical Trial there. PE: WNWD WM   T - 98   Phar - nl   TMs - dull, irregular and red   Neck - prominence of tonsillar nodes   Lungs - clear  Carol Influenza A/B - NEG    Imp: URI   Otitis media    Plan: Z-pack   Change to plain Mucinex   Saline NS   Hydration   ________________________  Expected course of current diagnosed problem(s) as well as expected progression and possible complications, and desired follow up with provider are discussed with patient. Patient is encouraged to be back in touch with any questions or concerns. Patient expresses understanding of plan of care. Patient is given AVS which includes diagnoses, current medications, vitals.

## 2019-10-01 ENCOUNTER — OFFICE VISIT (OUTPATIENT)
Dept: INTERNAL MEDICINE CLINIC | Age: 69
End: 2019-10-01

## 2019-10-01 VITALS
DIASTOLIC BLOOD PRESSURE: 80 MMHG | BODY MASS INDEX: 26.18 KG/M2 | WEIGHT: 187 LBS | RESPIRATION RATE: 17 BRPM | HEART RATE: 69 BPM | SYSTOLIC BLOOD PRESSURE: 138 MMHG | HEIGHT: 71 IN | OXYGEN SATURATION: 97 % | TEMPERATURE: 98.5 F

## 2019-10-01 DIAGNOSIS — Z00.00 MEDICARE ANNUAL WELLNESS VISIT, SUBSEQUENT: Primary | ICD-10-CM

## 2019-10-01 DIAGNOSIS — Z13.39 SCREENING FOR ALCOHOLISM: ICD-10-CM

## 2019-10-01 DIAGNOSIS — E78.2 MIXED HYPERLIPIDEMIA: ICD-10-CM

## 2019-10-01 DIAGNOSIS — Z13.31 SCREENING FOR DEPRESSION: ICD-10-CM

## 2019-10-01 NOTE — PROGRESS NOTES
Verified name and birth date for privacy precautions. Chart reviewed in preparation for today's visit. Chief Complaint   Patient presents with    Annual Wellness Visit          Health Maintenance Due   Topic    Shingrix Vaccine Age 50> (1 of 2)    Pneumococcal 65+ years (2 of 2 - PPSV23)    GLAUCOMA SCREENING Q2Y     MEDICARE YEARLY EXAM     Influenza Age 5 to Adult          Wt Readings from Last 3 Encounters:   10/01/19 187 lb (84.8 kg)   03/04/19 187 lb (84.8 kg)   03/01/19 192 lb (87.1 kg)     Temp Readings from Last 3 Encounters:   10/01/19 98.5 °F (36.9 °C) (Oral)   03/04/19 98 °F (36.7 °C) (Oral)   03/01/19 98.2 °F (36.8 °C) (Oral)     BP Readings from Last 3 Encounters:   10/01/19 138/80   03/04/19 128/74   03/01/19 150/80     Pulse Readings from Last 3 Encounters:   10/01/19 69   03/04/19 85   03/01/19 68         Learning Assessment:  :     Learning Assessment 8/6/2018 9/18/2017 10/10/2014   PRIMARY LEARNER Patient Patient Patient   HIGHEST LEVEL OF EDUCATION - PRIMARY LEARNER  2 YEARS OF COLLEGE - SOME COLLEGE   BARRIERS PRIMARY LEARNER NONE - NONE   CO-LEARNER CAREGIVER - - No   PRIMARY LANGUAGE ENGLISH ENGLISH ENGLISH    NEED - - No   LEARNER PREFERENCE PRIMARY DEMONSTRATION LISTENING OTHER (COMMENT)   LEARNING SPECIAL TOPICS - - no   ANSWERED BY patient paitient  patient   RELATIONSHIP SELF SELF SELF       Depression Screening:  :     3 most recent PHQ Screens 10/1/2019   Little interest or pleasure in doing things Not at all   Feeling down, depressed, irritable, or hopeless Not at all   Total Score PHQ 2 0       Fall Risk Assessment:  :     Fall Risk Assessment, last 12 mths 10/1/2019   Able to walk? Yes   Fall in past 12 months? No   Fall with injury? -   Number of falls in past 12 months -   Fall Risk Score -       Abuse Screening:  :     Abuse Screening Questionnaire 8/6/2018 1/2/2018 10/10/2014   Do you ever feel afraid of your partner?  N N N   Are you in a relationship with someone who physically or mentally threatens you? N N N   Is it safe for you to go home? Zulay Zuniga       Coordination of Care Questionnaire:  :     1) Have you been to an emergency room, urgent care clinic since your last visit? no   Hospitalized since your last visit? no             2) Have you seen or consulted any other health care providers outside of [address] [place] [address] since your last visit? yes  (Include any pap smears or colon screenings in this section.)    3) Do you have an Advance Directive on file?  yes      Patient is currently accompanied by his self.     ------------------------------------------------

## 2019-10-01 NOTE — PATIENT INSTRUCTIONS
Medicare Wellness Visit, Male The best way to live healthy is to have a lifestyle where you eat a well-balanced diet, exercise regularly, limit alcohol use, and quit all forms of tobacco/nicotine, if applicable. Regular preventive services are another way to keep healthy. Preventive services (vaccines, screening tests, monitoring & exams) can help personalize your care plan, which helps you manage your own care. Screening tests can find health problems at the earliest stages, when they are easiest to treat. [address] [facility] follows the current, evidence-based guidelines published by the [facility] ([facility]) when recommending preventive services for our patients. Because we follow these guidelines, sometimes recommendations change over time as research supports it. (For example, a prostate screening blood test is no longer routinely recommended for men with no symptoms.) Of course, you and your doctor may decide to screen more often for some diseases, based on your risk and co-morbidities (chronic disease you are already diagnosed with). Preventive services for you include: - Medicare offers their members a free annual wellness visit, which is time for you and your primary care provider to discuss and plan for your preventive service needs. Take advantage of this benefit every year! 
-All adults over age 72 should receive the recommended pneumonia vaccines. Current USPSTF guidelines recommend a series of two vaccines for the best pneumonia protection.  
-All adults should have a flu vaccine yearly and an ECG.  All adults age 61 and older should receive a shingles vaccine once in their lifetime.   
-All adults age 38-68 who are overweight should have a diabetes screening test once every three years.  
-Other screening tests & preventive services for persons with diabetes include: an eye exam to screen for diabetic retinopathy, a kidney function test, a foot exam, and stricter control over your cholesterol.  
-Cardiovascular screening for adults with routine risk involves an electrocardiogram (ECG) at intervals determined by the provider.  
-Colorectal cancer screening should be done for adults age 54-65 with no increased risk factors for colorectal cancer. There are a number of acceptable methods of screening for this type of cancer. Each test has its own benefits and drawbacks. Discuss with your provider what is most appropriate for you during your annual wellness visit. The different tests include: colonoscopy (considered the best screening method), a fecal occult blood test, a fecal DNA test, and sigmoidoscopy. 
-All adults born between [facility] should be screened once for Hepatitis C. 
-An Abdominal Aortic Aneurysm (AAA) Screening is recommended for men age 73-68 who has ever smoked in their lifetime.
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
## 2019-10-02 ENCOUNTER — HOSPITAL ENCOUNTER (OUTPATIENT)
Dept: LAB | Age: 69
Discharge: HOME | End: 2019-10-02
Payer: MEDICARE

## 2019-10-02 PROCEDURE — 80053 COMPREHEN METABOLIC PANEL: CPT

## 2019-10-02 PROCEDURE — 81001 URINALYSIS AUTO W/SCOPE: CPT

## 2019-10-02 PROCEDURE — 80061 LIPID PANEL: CPT

## 2019-10-02 PROCEDURE — 85025 COMPLETE CBC W/AUTO DIFF WBC: CPT

## 2019-10-02 PROCEDURE — 36415 COLL VENOUS BLD VENIPUNCTURE: CPT

## 2019-10-03 LAB
ALBUMIN SERPL-MCNC: 4.5 G/DL (ref 3.6–4.8)
ALBUMIN/GLOB SERPL: 1.7 {RATIO} (ref 1.2–2.2)
ALP SERPL-CCNC: 24 IU/L (ref 39–117)
ALT SERPL-CCNC: 15 IU/L (ref 0–44)
APPEARANCE UR: CLEAR
AST SERPL-CCNC: 20 IU/L (ref 0–40)
BACTERIA #/AREA URNS HPF: NORMAL /[HPF]
BASOPHILS # BLD AUTO: 0 X10E3/UL (ref 0–0.2)
BASOPHILS NFR BLD AUTO: 0 %
BILIRUB SERPL-MCNC: 0.5 MG/DL (ref 0–1.2)
BILIRUB UR QL STRIP: NEGATIVE
BUN SERPL-MCNC: 15 MG/DL (ref 8–27)
BUN/CREAT SERPL: 16 (ref 10–24)
CALCIUM SERPL-MCNC: 9.1 MG/DL (ref 8.6–10.2)
CASTS URNS QL MICRO: NORMAL /LPF
CHLORIDE SERPL-SCNC: 100 MMOL/L (ref 96–106)
CHOLEST SERPL-MCNC: 167 MG/DL (ref 100–199)
CO2 SERPL-SCNC: 24 MMOL/L (ref 20–29)
COLOR UR: YELLOW
CREAT SERPL-MCNC: 0.96 MG/DL (ref 0.76–1.27)
EOSINOPHIL # BLD AUTO: 0.1 X10E3/UL (ref 0–0.4)
EOSINOPHIL NFR BLD AUTO: 2 %
EPI CELLS #/AREA URNS HPF: NORMAL /HPF (ref 0–10)
ERYTHROCYTE [DISTWIDTH] IN BLOOD BY AUTOMATED COUNT: 13.6 % (ref 12.3–15.4)
GLOBULIN SER CALC-MCNC: 2.6 G/DL (ref 1.5–4.5)
GLUCOSE SERPL-MCNC: 93 MG/DL (ref 65–99)
GLUCOSE UR QL: NEGATIVE
HCT VFR BLD AUTO: 44.1 % (ref 37.5–51)
HDLC SERPL-MCNC: 30 MG/DL
HGB BLD-MCNC: 14.6 G/DL (ref 13–17.7)
HGB UR QL STRIP: NEGATIVE
IMM GRANULOCYTES # BLD AUTO: 0 X10E3/UL (ref 0–0.1)
IMM GRANULOCYTES NFR BLD AUTO: 0 %
KETONES UR QL STRIP: NEGATIVE
LDLC SERPL CALC-MCNC: 105 MG/DL (ref 0–99)
LEUKOCYTE ESTERASE UR QL STRIP: NEGATIVE
LYMPHOCYTES # BLD AUTO: 1.2 X10E3/UL (ref 0.7–3.1)
LYMPHOCYTES NFR BLD AUTO: 20 %
MCH RBC QN AUTO: 30.7 PG (ref 26.6–33)
MCHC RBC AUTO-ENTMCNC: 33.1 G/DL (ref 31.5–35.7)
MCV RBC AUTO: 93 FL (ref 79–97)
MICRO URNS: ABNORMAL
MICRO URNS: ABNORMAL
MONOCYTES # BLD AUTO: 0.7 X10E3/UL (ref 0.1–0.9)
MONOCYTES NFR BLD AUTO: 12 %
MUCOUS THREADS URNS QL MICRO: PRESENT
NEUTROPHILS # BLD AUTO: 3.9 X10E3/UL (ref 1.4–7)
NEUTROPHILS NFR BLD AUTO: 66 %
NITRITE UR QL STRIP: NEGATIVE
PH UR STRIP: 8.5 [PH] (ref 5–7.5)
PLATELET # BLD AUTO: 191 X10E3/UL (ref 150–450)
POTASSIUM SERPL-SCNC: 4.5 MMOL/L (ref 3.5–5.2)
PROT SERPL-MCNC: 7.1 G/DL (ref 6–8.5)
PROT UR QL STRIP: NEGATIVE
RBC # BLD AUTO: 4.75 X10E6/UL (ref 4.14–5.8)
RBC #/AREA URNS HPF: NORMAL /HPF (ref 0–2)
SODIUM SERPL-SCNC: 139 MMOL/L (ref 134–144)
SP GR UR: 1.02 (ref 1–1.03)
TRIGL SERPL-MCNC: 158 MG/DL (ref 0–149)
URINALYSIS REFLEX, 377202: ABNORMAL
UROBILINOGEN UR STRIP-MCNC: 0.2 MG/DL (ref 0.2–1)
VLDLC SERPL CALC-MCNC: 32 MG/DL (ref 5–40)
WBC # BLD AUTO: 5.9 X10E3/UL (ref 3.4–10.8)
WBC #/AREA URNS HPF: NORMAL /HPF (ref 0–5)

## 2019-12-06 ENCOUNTER — OFFICE VISIT (OUTPATIENT)
Dept: INTERNAL MEDICINE CLINIC | Age: 69
End: 2019-12-06

## 2019-12-06 VITALS
BODY MASS INDEX: 26.85 KG/M2 | HEART RATE: 64 BPM | DIASTOLIC BLOOD PRESSURE: 88 MMHG | TEMPERATURE: 98.7 F | WEIGHT: 191.8 LBS | SYSTOLIC BLOOD PRESSURE: 140 MMHG | OXYGEN SATURATION: 97 % | RESPIRATION RATE: 16 BRPM | HEIGHT: 71 IN

## 2019-12-06 DIAGNOSIS — R09.82 POST-NASAL DRIP: ICD-10-CM

## 2019-12-06 DIAGNOSIS — J06.9 VIRAL URI: Primary | ICD-10-CM

## 2019-12-06 NOTE — PROGRESS NOTES
HISTORY OF PRESENT ILLNESS  Juan Manuel Joaquin is a 78 y.o. male. Patient reports post-nasal drip and slight cough/congestion starting around 3 am today. He has taken mucinex and delsym with some relief. No fever or body aches. Wife has had similar symptoms this week. Visit Vitals  /88 (BP 1 Location: Left arm, BP Patient Position: Sitting)   Pulse 64   Temp 98.7 °F (37.1 °C) (Oral)   Resp 16   Ht 5' 10.87\" (1.8 m)   Wt 191 lb 12.8 oz (87 kg)   SpO2 97%   BMI 26.85 kg/m²       HPI    Review of Systems   HENT: Positive for congestion. Respiratory: Positive for cough. Physical Exam  Constitutional:       Appearance: Normal appearance. HENT:      Head: Normocephalic. Right Ear: Hearing, tympanic membrane, ear canal and external ear normal.      Left Ear: Hearing, tympanic membrane, ear canal and external ear normal.      Nose: Congestion (mild) present. Right Turbinates: Swollen. Left Turbinates: Swollen. Mouth/Throat:      Lips: Pink. Mouth: Mucous membranes are moist.      Pharynx: Posterior oropharyngeal erythema (mild, post-nasal drip) present. Neck:      Musculoskeletal: Normal range of motion. Cardiovascular:      Rate and Rhythm: Normal rate and regular rhythm. Pulmonary:      Effort: Pulmonary effort is normal.      Breath sounds: Normal breath sounds. Lymphadenopathy:      Cervical: No cervical adenopathy. Skin:     General: Skin is warm and dry. Neurological:      General: No focal deficit present. Mental Status: He is alert and oriented to person, place, and time. Psychiatric:         Mood and Affect: Mood normal.         Behavior: Behavior normal.         ASSESSMENT and PLAN    ICD-10-CM ICD-9-CM    1. Viral URI J06.9 465.9    2. Post-nasal drip R09.82 784.91      No orders of the defined types were placed in this encounter.   flonase  mucinex  Nasal saline rinses  Rest and hydrate

## 2019-12-06 NOTE — PROGRESS NOTES
Chief Complaint   Patient presents with    Cold Symptoms     Reviewed record in preparation for visit and have obtained necessary documentation. Identified pt with two pt identifiers(name and ). There are no preventive care reminders to display for this patient. Chief Complaint   Patient presents with    Cold Symptoms        Wt Readings from Last 3 Encounters:   19 191 lb 12.8 oz (87 kg)   10/01/19 187 lb (84.8 kg)   19 187 lb (84.8 kg)     Temp Readings from Last 3 Encounters:   19 98.7 °F (37.1 °C) (Oral)   10/01/19 98.5 °F (36.9 °C) (Oral)   19 98 °F (36.7 °C) (Oral)     BP Readings from Last 3 Encounters:   19 140/88   10/01/19 138/80   19 128/74     Pulse Readings from Last 3 Encounters:   19 64   10/01/19 69   19 85           Learning Assessment:  :     Learning Assessment 2018 2017 10/10/2014   PRIMARY LEARNER Patient Patient Patient   HIGHEST LEVEL OF EDUCATION - PRIMARY LEARNER  2 YEARS OF COLLEGE - SOME COLLEGE   BARRIERS PRIMARY LEARNER NONE - NONE   CO-LEARNER CAREGIVER - - No   PRIMARY LANGUAGE ENGLISH ENGLISH ENGLISH    NEED - - No   LEARNER PREFERENCE PRIMARY DEMONSTRATION LISTENING OTHER (COMMENT)   LEARNING SPECIAL TOPICS - - no   ANSWERED BY patient paitient  patient   RELATIONSHIP SELF SELF SELF       Depression Screening:  :     3 most recent PHQ Screens 10/1/2019   Little interest or pleasure in doing things Not at all   Feeling down, depressed, irritable, or hopeless Not at all   Total Score PHQ 2 0       Fall Risk Assessment:  :     Fall Risk Assessment, last 12 mths 10/1/2019   Able to walk? Yes   Fall in past 12 months? No   Fall with injury? -   Number of falls in past 12 months -   Fall Risk Score -       Abuse Screening:  :     Abuse Screening Questionnaire 2018 2018 10/10/2014   Do you ever feel afraid of your partner?  N N N   Are you in a relationship with someone who physically or mentally threatens you? N N N   Is it safe for you to go home? Y Y Y       Coordination of Care Questionnaire:  :     1) Have you been to an emergency room, urgent care clinic since your last visit? no   Hospitalized since your last visit? no             2) Have you seen or consulted any other health care providers outside of [address] [place] [address] since your last visit? no  (Include any pap smears or colon screenings in this section.)    3) Do you have an Advance Directive on file? yes    4) Are you interested in receiving information on Advance Directives? NO      Patient is accompanied by spouse I have received verbal consent from Ara Ovalle to discuss any/all medical information while they are present in the room. Reviewed record  In preparation for visit and have obtained necessary documentation. I will START or STAY ON the medications listed below when I get home from the hospital:    acetaminophen 325 mg oral tablet  -- 3 tab(s) by mouth   -- Indication: For mild pain    ibuprofen 600 mg oral tablet  -- 1 tab(s) by mouth every 6 hours  -- Indication: For cramping

## 2019-12-06 NOTE — PATIENT INSTRUCTIONS

## 2020-06-05 PROBLEM — C43.62 MALIGNANT MELANOMA OF LEFT UPPER EXTREMITY INCLUDING SHOULDER (HCC): Status: ACTIVE | Noted: 2020-06-05

## 2020-10-14 ENCOUNTER — OFFICE VISIT (OUTPATIENT)
Dept: INTERNAL MEDICINE CLINIC | Age: 70
End: 2020-10-14
Payer: MEDICARE

## 2020-10-14 VITALS
OXYGEN SATURATION: 96 % | TEMPERATURE: 97.8 F | WEIGHT: 190.2 LBS | SYSTOLIC BLOOD PRESSURE: 149 MMHG | RESPIRATION RATE: 20 BRPM | HEIGHT: 70 IN | HEART RATE: 78 BPM | BODY MASS INDEX: 27.23 KG/M2 | DIASTOLIC BLOOD PRESSURE: 82 MMHG

## 2020-10-14 DIAGNOSIS — Z00.00 MEDICARE ANNUAL WELLNESS VISIT, SUBSEQUENT: Primary | ICD-10-CM

## 2020-10-14 DIAGNOSIS — E78.2 MIXED HYPERLIPIDEMIA: ICD-10-CM

## 2020-10-14 DIAGNOSIS — Z13.31 SCREENING FOR DEPRESSION: ICD-10-CM

## 2020-10-14 DIAGNOSIS — C61 PROSTATE CANCER (HCC): ICD-10-CM

## 2020-10-14 DIAGNOSIS — Z23 ENCOUNTER FOR IMMUNIZATION: ICD-10-CM

## 2020-10-14 PROCEDURE — G0439 PPPS, SUBSEQ VISIT: HCPCS | Performed by: INTERNAL MEDICINE

## 2020-10-14 PROCEDURE — G8427 DOCREV CUR MEDS BY ELIG CLIN: HCPCS | Performed by: INTERNAL MEDICINE

## 2020-10-14 PROCEDURE — G8536 NO DOC ELDER MAL SCRN: HCPCS | Performed by: INTERNAL MEDICINE

## 2020-10-14 PROCEDURE — G8510 SCR DEP NEG, NO PLAN REQD: HCPCS | Performed by: INTERNAL MEDICINE

## 2020-10-14 PROCEDURE — 3017F COLORECTAL CA SCREEN DOC REV: CPT | Performed by: INTERNAL MEDICINE

## 2020-10-14 PROCEDURE — 1101F PT FALLS ASSESS-DOCD LE1/YR: CPT | Performed by: INTERNAL MEDICINE

## 2020-10-14 PROCEDURE — G8419 CALC BMI OUT NRM PARAM NOF/U: HCPCS | Performed by: INTERNAL MEDICINE

## 2020-10-14 PROCEDURE — 90732 PPSV23 VACC 2 YRS+ SUBQ/IM: CPT
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
## 2020-10-14 NOTE — PATIENT INSTRUCTIONS
Medicare Wellness Visit, Male The best way to live healthy is to have a lifestyle where you eat a well-balanced diet, exercise regularly, limit alcohol use, and quit all forms of tobacco/nicotine, if applicable. Regular preventive services are another way to keep healthy. Preventive services (vaccines, screening tests, monitoring & exams) can help personalize your care plan, which helps you manage your own care. Screening tests can find health problems at the earliest stages, when they are easiest to treat. [facility] follows the current, evidence-based guidelines published by the [facility] ([facility]) when recommending preventive services for our patients. Because we follow these guidelines, sometimes recommendations change over time as research supports it. (For example, a prostate screening blood test is no longer routinely recommended for men with no symptoms). Of course, you and your doctor may decide to screen more often for some diseases, based on your risk and co-morbidities (chronic disease you are already diagnosed with). Preventive services for you include: - Medicare offers their members a free annual wellness visit, which is time for you and your primary care provider to discuss and plan for your preventive service needs. Take advantage of this benefit every year! 
-All adults over age 72 should receive the recommended pneumonia vaccines. Current USPSTF guidelines recommend a series of two vaccines for the best pneumonia protection.  
-All adults should have a flu vaccine yearly and tetanus vaccine every 10 years. 
-All adults age 48 and older should receive the shingles vaccines (series of two vaccines).       
-All adults age 38-68 who are overweight should have a diabetes screening test once every three years.  
-Other screening tests & preventive services for persons with diabetes include: an eye exam to screen for diabetic retinopathy, a kidney function test, a foot exam, and stricter control over your cholesterol.  
-Cardiovascular screening for adults with routine risk involves an electrocardiogram (ECG) at intervals determined by the provider.  
-Colorectal cancer screening should be done for adults age 54-65 with no increased risk factors for colorectal cancer. There are a number of acceptable methods of screening for this type of cancer. Each test has its own benefits and drawbacks. Discuss with your provider what is most appropriate for you during your annual wellness visit. The different tests include: colonoscopy (considered the best screening method), a fecal occult blood test, a fecal DNA test, and sigmoidoscopy. 
-All adults born between [facility] should be screened once for Hepatitis C. 
-An Abdominal Aortic Aneurysm (AAA) Screening is recommended for men age 73-68 who has ever smoked in their lifetime. Here is a list of your current Health Maintenance items (your personalized list of preventive services) with a due date: 
Health Maintenance Due Topic Date Due  Shingles Vaccine (1 of 2) 10/07/1999  Pneumococcal Vaccine (2 of 2 - PPSV23) 11/01/2016  Glaucoma Screening   10/01/2020 [facility] Annual Well Visit  10/01/2020 Vaccine Information Statement Pneumococcal Polysaccharide Vaccine (PPSV23): What You Need to Know Many Vaccine Information Statements are available in [place]ian and other languages. See www.immunize.org/vis Hojas de información sobre vacunas están disponibles en español y en muchos otros idiomas. Visite www.immunize.org/vis 1. Why get vaccinated? Pneumococcal polysaccharide vaccine (PPSV23) can prevent pneumococcal disease. Pneumococcal disease refers to any illness caused by pneumococcal bacteria. These bacteria can cause many types of illnesses, including pneumonia, which is an infection of the lungs.   Pneumococcal bacteria are one of the most common causes of pneumonia. Besides pneumonia, pneumococcal bacteria can also cause: 
 Ear infections  Sinus infections  Meningitis (infection of the tissue covering the brain and spinal cord)  Bacteremia (bloodstream infection) Anyone can get pneumococcal disease, but children under 3years of age, people with certain medical conditions, adults 72 years or older, and cigarette smokers are at the highest risk. Most pneumococcal infections are mild. However, some can result in long-term problems, such as brain damage or hearing loss. Meningitis, bacteremia, and pneumonia caused by pneumococcal disease can be fatal.  
 
2. PPSV23  
 
PPSV23 protects against 23 types of bacteria that cause pneumococcal disease. PPSV23 is recommended for:  All adults 72 years or older,  Anyone 2 years or older with certain medical conditions that can lead to an increased risk for pneumococcal disease. Most people need only one dose of PPSV23. A second dose of PPSV23, and another type of pneumococcal vaccine called PCV13, are recommended for certain high-risk groups. Your health care provider can give you more information. People 65 years or older should get a dose of PPSV23 even if they have already gotten one or more doses of the vaccine before they turned 72. 
 
3. Talk with your health care provider Tell your vaccine provider if the person getting the vaccine: 
 Has had an allergic reaction after a previous dose of PPSV23, or has any severe, life-threatening allergies. In some cases, your health care provider may decide to postpone PPSV23 vaccination to a future visit. People with minor illnesses, such as a cold, may be vaccinated. People who are moderately or severely ill should usually wait until they recover before getting PPSV23. Your health care provider can give you more information. 4. Risks of a vaccine reaction  Redness or pain where the shot is given, feeling tired, fever, or muscle aches can happen after PPSV23. People sometimes faint after medical procedures, including vaccination. Tell your provider if you feel dizzy or have vision changes or ringing in the ears. As with any medicine, there is a very remote chance of a vaccine causing a severe allergic reaction, other serious injury, or death. 5. What if there is a serious problem? An allergic reaction could occur after the vaccinated person leaves the clinic. If you see signs of a severe allergic reaction (hives, swelling of the face and throat, difficulty breathing, a fast heartbeat, dizziness, or weakness), call 9-1-1 and get the person to the nearest hospital. 
 
For other signs that concern you, call your health care provider. Adverse reactions should be reported to the Vaccine Adverse Event Reporting System (VAERS). Your health care provider will usually file this report, or you can do it yourself. Visit the VAERS website at www.vaers. [facility].gov or call 5-340.108.7954. VAERS is only for reporting reactions, and VAERS staff do not give medical advice. 6. How can I learn more?  Ask your health care provider.  Call your local or state health department.  Contact the Centers for Disease Control and Prevention (CDC): 
- Call 7-489.742.9652 (1-800-CDC-INFO) or 
- Visit CDCs website at www.cdc.gov/vaccines Vaccine Information Statement PPSV23  
10/30/2019 Department of [facility] and [employer] Centers for Disease Control and Prevention Office Use Only

## 2020-10-23 ENCOUNTER — HOSPITAL ENCOUNTER (OUTPATIENT)
Dept: LAB | Age: 70
Discharge: HOME | End: 2020-10-23
Payer: MEDICARE

## 2020-10-23 PROCEDURE — 80053 COMPREHEN METABOLIC PANEL: CPT

## 2020-10-23 PROCEDURE — 85025 COMPLETE CBC W/AUTO DIFF WBC: CPT

## 2020-10-23 PROCEDURE — 80061 LIPID PANEL: CPT

## 2020-10-23 PROCEDURE — 36415 COLL VENOUS BLD VENIPUNCTURE: CPT

## 2020-10-24 LAB
ALBUMIN SERPL-MCNC: 4.4 G/DL (ref 3.7–4.7)
ALBUMIN/GLOB SERPL: 1.8 {RATIO} (ref 1.2–2.2)
ALP SERPL-CCNC: 30 IU/L (ref 39–117)
ALT SERPL-CCNC: 17 IU/L (ref 0–44)
AST SERPL-CCNC: 20 IU/L (ref 0–40)
BASOPHILS # BLD AUTO: 0 X10E3/UL (ref 0–0.2)
BASOPHILS NFR BLD AUTO: 1 %
BILIRUB SERPL-MCNC: 0.4 MG/DL (ref 0–1.2)
BUN SERPL-MCNC: 13 MG/DL (ref 8–27)
BUN/CREAT SERPL: 14 (ref 10–24)
CALCIUM SERPL-MCNC: 9.8 MG/DL (ref 8.6–10.2)
CHLORIDE SERPL-SCNC: 102 MMOL/L (ref 96–106)
CHOLEST SERPL-MCNC: 182 MG/DL (ref 100–199)
CO2 SERPL-SCNC: 28 MMOL/L (ref 20–29)
CREAT SERPL-MCNC: 0.95 MG/DL (ref 0.76–1.27)
EOSINOPHIL # BLD AUTO: 0.1 X10E3/UL (ref 0–0.4)
EOSINOPHIL NFR BLD AUTO: 2 %
ERYTHROCYTE [DISTWIDTH] IN BLOOD BY AUTOMATED COUNT: 12.4 % (ref 11.6–15.4)
GLOBULIN SER CALC-MCNC: 2.4 G/DL (ref 1.5–4.5)
GLUCOSE SERPL-MCNC: 92 MG/DL (ref 65–99)
HCT VFR BLD AUTO: 46 % (ref 37.5–51)
HDLC SERPL-MCNC: 32 MG/DL
HGB BLD-MCNC: 15.5 G/DL (ref 13–17.7)
IMM GRANULOCYTES # BLD AUTO: 0 X10E3/UL (ref 0–0.1)
IMM GRANULOCYTES NFR BLD AUTO: 1 %
LDLC SERPL CALC-MCNC: 124 MG/DL (ref 0–99)
LYMPHOCYTES # BLD AUTO: 1.3 X10E3/UL (ref 0.7–3.1)
LYMPHOCYTES NFR BLD AUTO: 19 %
MCH RBC QN AUTO: 30.8 PG (ref 26.6–33)
MCHC RBC AUTO-ENTMCNC: 33.7 G/DL (ref 31.5–35.7)
MCV RBC AUTO: 92 FL (ref 79–97)
MONOCYTES # BLD AUTO: 0.7 X10E3/UL (ref 0.1–0.9)
MONOCYTES NFR BLD AUTO: 10 %
NEUTROPHILS # BLD AUTO: 4.6 X10E3/UL (ref 1.4–7)
NEUTROPHILS NFR BLD AUTO: 67 %
PLATELET # BLD AUTO: 200 X10E3/UL (ref 150–450)
POTASSIUM SERPL-SCNC: 5 MMOL/L (ref 3.5–5.2)
PROT SERPL-MCNC: 6.8 G/DL (ref 6–8.5)
RBC # BLD AUTO: 5.03 X10E6/UL (ref 4.14–5.8)
SODIUM SERPL-SCNC: 141 MMOL/L (ref 134–144)
TRIGL SERPL-MCNC: 145 MG/DL (ref 0–149)
VLDLC SERPL CALC-MCNC: 26 MG/DL (ref 5–40)
WBC # BLD AUTO: 6.8 X10E3/UL (ref 3.4–10.8)

## 2021-01-18 ENCOUNTER — OFFICE VISIT (OUTPATIENT)
Dept: INTERNAL MEDICINE CLINIC | Age: 71
End: 2021-01-18
Payer: MEDICARE

## 2021-01-18 ENCOUNTER — TELEPHONE (OUTPATIENT)
Dept: INTERNAL MEDICINE CLINIC | Age: 71
End: 2021-01-18

## 2021-01-18 VITALS
OXYGEN SATURATION: 97 % | WEIGHT: 191 LBS | HEART RATE: 60 BPM | DIASTOLIC BLOOD PRESSURE: 90 MMHG | HEIGHT: 70 IN | RESPIRATION RATE: 18 BRPM | BODY MASS INDEX: 27.35 KG/M2 | TEMPERATURE: 97.1 F | SYSTOLIC BLOOD PRESSURE: 176 MMHG

## 2021-01-18 DIAGNOSIS — M54.32 SCIATICA OF LEFT SIDE: Primary | ICD-10-CM

## 2021-01-18 DIAGNOSIS — R03.0 ELEVATED BP WITHOUT DIAGNOSIS OF HYPERTENSION: ICD-10-CM

## 2021-01-18 PROCEDURE — 99214 OFFICE O/P EST MOD 30 MIN: CPT | Performed by: INTERNAL MEDICINE

## 2021-01-18 PROCEDURE — G8536 NO DOC ELDER MAL SCRN: HCPCS | Performed by: INTERNAL MEDICINE

## 2021-01-18 PROCEDURE — G8510 SCR DEP NEG, NO PLAN REQD: HCPCS | Performed by: INTERNAL MEDICINE

## 2021-01-18 PROCEDURE — G8427 DOCREV CUR MEDS BY ELIG CLIN: HCPCS | Performed by: INTERNAL MEDICINE

## 2021-01-18 PROCEDURE — 1101F PT FALLS ASSESS-DOCD LE1/YR: CPT | Performed by: INTERNAL MEDICINE

## 2021-01-18 PROCEDURE — G8419 CALC BMI OUT NRM PARAM NOF/U: HCPCS | Performed by: INTERNAL MEDICINE

## 2021-01-18 PROCEDURE — G0463 HOSPITAL OUTPT CLINIC VISIT: HCPCS | Performed by: INTERNAL MEDICINE

## 2021-01-18 PROCEDURE — 3017F COLORECTAL CA SCREEN DOC REV: CPT | Performed by: INTERNAL MEDICINE

## 2021-01-18 RX ORDER — METHOCARBAMOL 500 MG/1
500 TABLET, FILM COATED ORAL 3 TIMES DAILY
Qty: 30 TAB | Refills: 0 | Status: SHIPPED | OUTPATIENT
Start: 2021-01-18 | End: 2021-01-19 | Stop reason: SDUPTHER

## 2021-01-18 RX ORDER — MELOXICAM 7.5 MG/1
7.5 TABLET ORAL
Qty: 30 TAB | Refills: 0 | Status: SHIPPED | OUTPATIENT
Start: 2021-01-18 | End: 2021-10-12 | Stop reason: ALTCHOICE

## 2021-01-18 NOTE — PATIENT INSTRUCTIONS
Mobic 1 tablet daily with food for pain 
Robaxin 1 tablet 3 times daily as needed for back spasms Please monitor your blood pressure Return 1 month to Azucena Black MD for follow up blood pressure
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
## 2021-01-19 NOTE — TELEPHONE ENCOUNTER
Requested Prescriptions     Pending Prescriptions Disp Refills    methocarbamoL (ROBAXIN) 500 mg tablet 30 Tab 0     Sig: Take 1 Tab by mouth three (3) times daily.         Requesting a prescription for an alternative - This medicine is not covered under patients Insurance                [facility]/pharmacy [address] [place] [address]

## 2021-01-20 RX ORDER — METHOCARBAMOL 500 MG/1
500 TABLET, FILM COATED ORAL 3 TIMES DAILY
Qty: 30 TAB | Refills: 0 | Status: SHIPPED | OUTPATIENT
Start: 2021-01-20 | End: 2022-09-17 | Stop reason: ALTCHOICE

## 2021-04-07 ENCOUNTER — TRANSCRIBE ORDER (OUTPATIENT)
Dept: SCHEDULING | Age: 71
End: 2021-04-07

## 2021-04-07 DIAGNOSIS — C61 PROSTATE CANCER (HCC): Primary | ICD-10-CM

## 2021-04-20 ENCOUNTER — HOSPITAL ENCOUNTER (OUTPATIENT)
Dept: PET IMAGING | Age: 71
Discharge: HOME | End: 2021-04-20
Attending: UROLOGY
Payer: MEDICARE

## 2021-04-20 VITALS — BODY MASS INDEX: 25.9 KG/M2 | WEIGHT: 185 LBS | HEIGHT: 71 IN

## 2021-04-20 DIAGNOSIS — C61 PROSTATE CANCER (HCC): ICD-10-CM

## 2021-04-20 PROCEDURE — 78815 PET IMAGE W/CT SKULL-THIGH: CPT

## 2021-04-20 RX ORDER — SODIUM CHLORIDE 0.9 % (FLUSH) 0.9 %
10 SYRINGE (ML) INJECTION
Status: COMPLETED | OUTPATIENT
Start: 2021-04-20 | End: 2021-04-20

## 2021-04-20 RX ADMIN — Medication 10 ML: at 14:29

## 2021-10-07 ENCOUNTER — HOSPITAL ENCOUNTER (INPATIENT)
Age: 71
LOS: 1 days | Discharge: HOME | DRG: 069 | End: 2021-10-08
Attending: EMERGENCY MEDICINE | Admitting: HOSPITALIST
Payer: MEDICARE

## 2021-10-07 ENCOUNTER — APPOINTMENT (OUTPATIENT)
Dept: CT IMAGING | Age: 71
DRG: 069 | End: 2021-10-07
Attending: EMERGENCY MEDICINE
Payer: MEDICARE

## 2021-10-07 ENCOUNTER — TELEPHONE (OUTPATIENT)
Dept: INTERNAL MEDICINE CLINIC | Age: 71
End: 2021-10-07

## 2021-10-07 DIAGNOSIS — R20.0 LEFT FACIAL NUMBNESS: ICD-10-CM

## 2021-10-07 DIAGNOSIS — R20.0 LEFT ARM NUMBNESS: Primary | ICD-10-CM

## 2021-10-07 PROBLEM — I63.9 CVA (CEREBRAL VASCULAR ACCIDENT) (HCC): Status: ACTIVE | Noted: 2021-10-07

## 2021-10-07 LAB
ALBUMIN SERPL-MCNC: 4.1 G/DL (ref 3.5–5)
ALBUMIN/GLOB SERPL: 1.1 {RATIO} (ref 1.1–2.2)
ALP SERPL-CCNC: 43 U/L (ref 45–117)
ALT SERPL-CCNC: 22 U/L (ref 12–78)
ANION GAP SERPL CALC-SCNC: 4 MMOL/L (ref 5–15)
AST SERPL-CCNC: 16 U/L (ref 15–37)
ATRIAL RATE: 67 BPM
BASOPHILS # BLD: 0 K/UL (ref 0–0.1)
BASOPHILS NFR BLD: 0 % (ref 0–1)
BILIRUB SERPL-MCNC: 0.4 MG/DL (ref 0.2–1)
BUN SERPL-MCNC: 14 MG/DL (ref 6–20)
BUN/CREAT SERPL: 16 (ref 12–20)
CALCIUM SERPL-MCNC: 9.6 MG/DL (ref 8.5–10.1)
CALCULATED P AXIS, ECG09: 36 DEGREES
CALCULATED R AXIS, ECG10: 4 DEGREES
CALCULATED T AXIS, ECG11: 54 DEGREES
CHLORIDE SERPL-SCNC: 104 MMOL/L (ref 97–108)
CO2 SERPL-SCNC: 30 MMOL/L (ref 21–32)
COMMENT, HOLDF: NORMAL
CREAT SERPL-MCNC: 0.87 MG/DL (ref 0.7–1.3)
DIAGNOSIS, 93000: NORMAL
DIFFERENTIAL METHOD BLD: NORMAL
EOSINOPHIL # BLD: 0.1 K/UL (ref 0–0.4)
EOSINOPHIL NFR BLD: 2 % (ref 0–7)
ERYTHROCYTE [DISTWIDTH] IN BLOOD BY AUTOMATED COUNT: 12.4 % (ref 11.5–14.5)
GLOBULIN SER CALC-MCNC: 3.8 G/DL (ref 2–4)
GLUCOSE BLD STRIP.AUTO-MCNC: 116 MG/DL (ref 65–117)
GLUCOSE SERPL-MCNC: 115 MG/DL (ref 65–100)
HCT VFR BLD AUTO: 44.8 % (ref 36.6–50.3)
HGB BLD-MCNC: 15 G/DL (ref 12.1–17)
IMM GRANULOCYTES # BLD AUTO: 0 K/UL (ref 0–0.04)
IMM GRANULOCYTES NFR BLD AUTO: 0 % (ref 0–0.5)
INR PPP: 1 (ref 0.9–1.1)
LYMPHOCYTES # BLD: 1.4 K/UL (ref 0.8–3.5)
LYMPHOCYTES NFR BLD: 20 % (ref 12–49)
MCH RBC QN AUTO: 31.7 PG (ref 26–34)
MCHC RBC AUTO-ENTMCNC: 33.5 G/DL (ref 30–36.5)
MCV RBC AUTO: 94.7 FL (ref 80–99)
MONOCYTES # BLD: 0.8 K/UL (ref 0–1)
MONOCYTES NFR BLD: 10 % (ref 5–13)
NEUTS SEG # BLD: 4.9 K/UL (ref 1.8–8)
NEUTS SEG NFR BLD: 68 % (ref 32–75)
NRBC # BLD: 0 K/UL (ref 0–0.01)
NRBC BLD-RTO: 0 PER 100 WBC
P-R INTERVAL, ECG05: 142 MS
PLATELET # BLD AUTO: 184 K/UL (ref 150–400)
PMV BLD AUTO: 10.4 FL (ref 8.9–12.9)
POTASSIUM SERPL-SCNC: 4.2 MMOL/L (ref 3.5–5.1)
PROT SERPL-MCNC: 7.9 G/DL (ref 6.4–8.2)
PROTHROMBIN TIME: 10.4 SEC (ref 9–11.1)
Q-T INTERVAL, ECG07: 404 MS
QRS DURATION, ECG06: 92 MS
QTC CALCULATION (BEZET), ECG08: 426 MS
RBC # BLD AUTO: 4.73 M/UL (ref 4.1–5.7)
SAMPLES BEING HELD,HOLD: NORMAL
SERVICE CMNT-IMP: NORMAL
SODIUM SERPL-SCNC: 138 MMOL/L (ref 136–145)
TROPONIN-HIGH SENSITIVITY: 8 NG/L (ref 0–76)
VENTRICULAR RATE, ECG03: 67 BPM
WBC # BLD AUTO: 7.3 K/UL (ref 4.1–11.1)

## 2021-10-07 PROCEDURE — 82962 GLUCOSE BLOOD TEST: CPT

## 2021-10-07 PROCEDURE — 85610 PROTHROMBIN TIME: CPT

## 2021-10-07 PROCEDURE — 36415 COLL VENOUS BLD VENIPUNCTURE: CPT

## 2021-10-07 PROCEDURE — 94762 N-INVAS EAR/PLS OXIMTRY CONT: CPT

## 2021-10-07 PROCEDURE — 74011250636 HC RX REV CODE- 250/636: Performed by: HOSPITALIST

## 2021-10-07 PROCEDURE — 99285 EMERGENCY DEPT VISIT HI MDM: CPT

## 2021-10-07 PROCEDURE — 65660000000 HC RM CCU STEPDOWN

## 2021-10-07 PROCEDURE — 70450 CT HEAD/BRAIN W/O DYE: CPT

## 2021-10-07 PROCEDURE — 93005 ELECTROCARDIOGRAM TRACING: CPT

## 2021-10-07 PROCEDURE — 80053 COMPREHEN METABOLIC PANEL: CPT

## 2021-10-07 PROCEDURE — 85025 COMPLETE CBC W/AUTO DIFF WBC: CPT

## 2021-10-07 PROCEDURE — 84484 ASSAY OF TROPONIN QUANT: CPT

## 2021-10-07 RX ORDER — HYDRALAZINE HYDROCHLORIDE 20 MG/ML
20 INJECTION INTRAMUSCULAR; INTRAVENOUS ONCE
Status: COMPLETED | OUTPATIENT
Start: 2021-10-07 | End: 2021-10-07

## 2021-10-07 RX ADMIN — HYDRALAZINE HYDROCHLORIDE 20 MG: 20 INJECTION INTRAMUSCULAR; INTRAVENOUS at 16:24

## 2021-10-07 NOTE — ED NOTES
Triage note: pt with numbness in tongue and lips on left side with left arm and leg numbness.   Onset on symptoms at 12:30
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
## 2021-10-07 NOTE — TELEPHONE ENCOUNTER
Patients wife states that the patient has, been having tingling in the left side of his face, hand and leg since 12:30pm today. She states that he denies having facial drooping, slurred speech or chest pain. I advised the wife to take him to the ER to be evaluated, to make sure he is not having a stroke. Patient wife asked for Dr. Surendra Ivy opinion, spoke to  and she advised that the patient go to the ER. Advised wife and she verbalized understanding.

## 2021-10-07 NOTE — PROGRESS NOTES
Transition of Care Plan: Core Measure ACO     RUR:  % GLOS:   TBD   LOS:    0  Disposition:  Return to home with spouse  Follow up appointments:  PCP/Neuro  DME needed:  None  Transportation at Discharge: Spouse to transport   IM Medicare letter:  Yes  Caregiver Contact/NOK: Valeriano Davalos     [phone]  Plan of Care:    Stroke Workup, CT Code Neuro, Tele Neurology Consult, Hospitalist consulted. Medicare pt has received, reviewed, and signed 2nd IM letter informing them of their right to appeal the discharge. Medicare Primary and AARP Secondary. Reason for Admission:   Numbness of tongue and lips, left side arm and left side leg weakness. RUR Score:          Low Risk           Plan for utilizing home health:      No home health identified    PCP: First and Last name:  Margo Santana MD     Name of Practice:   [facility]   Are you a current patient: Yes/No:  Yes   Approximate date of last visit:   1/18/21   Can you participate in a virtual visit with your PCP:   yes                    Current Advanced Directive/Advance Care Plan: No Order    Healthcare Decision Maker:   Click here to complete [address] including selection of the Healthcare Decision Maker Relationship (ie \"Primary\")           Discussed importance of health care decision maker                  Transition of Care Plan:                      Alyse Akbar is a 66year old male to [facility] [place] [facility], he developed numbness in tongue and lip with weakness in left arm and left leg. ED workup:  STROKE workup. CT Code Neuro. Tele-Neurologist, hospitalist consulted for admission. Met with patient, spouse at bedside. They are both retired, good support system. He is alert and oriented, independent with ADL/ IADLS. He lives in two story home, 5 steps to enter. Verified facesheet and demographics. Care Management Interventions  PCP Verified by CM:  Yes (Dr. Maddie Junior)  Last Visit to PCP: 01/18/21  Palliative Care Criteria Met (RRAT>21 & CHF Dx)?: No  Transition of Care Consult (CM Consult): Discharge Planning [facility]   Core Measure   Stroke Initial Assessment   )  Saul Signup: No (Active My Chart)  Discharge Durable Medical Equipment: No  Health Maintenance Reviewed: Yes  Physical Therapy Consult: Yes  Occupational Therapy Consult: Yes  Speech Therapy Consult: No  Support Systems: Spouse/Significant Other (Spouse greatest support, retired air craft  , spouse at bedside)      CM will continue to follow and assist for transition of care needs, plan to home when medically stable. Independent prior to admission.     Maira Batres, RN, BSN, [facility]  ED Care Management  [phone]

## 2021-10-08 ENCOUNTER — APPOINTMENT (OUTPATIENT)
Dept: MRI IMAGING | Age: 71
DRG: 069 | End: 2021-10-08
Attending: HOSPITALIST
Payer: MEDICARE

## 2021-10-08 ENCOUNTER — APPOINTMENT (OUTPATIENT)
Dept: NON INVASIVE DIAGNOSTICS | Age: 71
DRG: 069 | End: 2021-10-08
Attending: HOSPITALIST
Payer: MEDICARE

## 2021-10-08 VITALS
RESPIRATION RATE: 18 BRPM | OXYGEN SATURATION: 93 % | SYSTOLIC BLOOD PRESSURE: 159 MMHG | HEIGHT: 71 IN | BODY MASS INDEX: 25.9 KG/M2 | WEIGHT: 185 LBS | TEMPERATURE: 97.9 F | DIASTOLIC BLOOD PRESSURE: 84 MMHG | HEART RATE: 86 BPM

## 2021-10-08 PROBLEM — G45.9 TIA (TRANSIENT ISCHEMIC ATTACK): Status: ACTIVE | Noted: 2021-10-08

## 2021-10-08 LAB
CHOLEST SERPL-MCNC: 189 MG/DL
ECHO AO ROOT DIAM: 4.02 CM
ECHO AV AREA PEAK VELOCITY: 2.6 CM2
ECHO AV AREA/BSA PEAK VELOCITY: 1.3 CM2/M2
ECHO AV PEAK GRADIENT: 7.1 MMHG
ECHO AV PEAK VELOCITY: 133.25 CM/S
ECHO LV INTERNAL DIMENSION DIASTOLIC: 4.84 CM (ref 4.2–5.9)
ECHO LV INTERNAL DIMENSION SYSTOLIC: 3.81 CM
ECHO LV IVSD: 1.1 CM (ref 0.6–1)
ECHO LV MASS 2D: 155.4 G (ref 88–224)
ECHO LV MASS INDEX 2D: 76.2 G/M2 (ref 49–115)
ECHO LV POSTERIOR WALL DIASTOLIC: 0.75 CM (ref 0.6–1)
ECHO LVOT DIAM: 2.12 CM
ECHO LVOT PEAK GRADIENT: 3.84 MMHG
ECHO LVOT PEAK VELOCITY: 97.98 CM/S
ECHO MV A VELOCITY: 87.62 CM/S
ECHO MV AREA PHT: 2.08 CM2
ECHO MV E DECELERATION TIME (DT): 364.33 MS
ECHO MV E VELOCITY: 54.52 CM/S
ECHO MV E/A RATIO: 0.62
ECHO MV PRESSURE HALF TIME (PHT): 105.66 MS
ECHO PV PEAK INSTANTANEOUS GRADIENT SYSTOLIC: 2.22 MMHG
ECHO RV INTERNAL DIMENSION: 3.07 CM
ECHO RV TAPSE: 2.46 CM (ref 1.5–2)
EST. AVERAGE GLUCOSE BLD GHB EST-MCNC: 108 MG/DL
HBA1C MFR BLD: 5.4 % (ref 4–5.6)
HDLC SERPL-MCNC: 39 MG/DL
HDLC SERPL: 4.8 {RATIO} (ref 0–5)
LDLC SERPL CALC-MCNC: 118.8 MG/DL (ref 0–100)
TRIGL SERPL-MCNC: 156 MG/DL (ref ?–150)
VLDLC SERPL CALC-MCNC: 31.2 MG/DL

## 2021-10-08 PROCEDURE — 80061 LIPID PANEL: CPT

## 2021-10-08 PROCEDURE — 74011250637 HC RX REV CODE- 250/637: Performed by: HOSPITALIST

## 2021-10-08 PROCEDURE — 36415 COLL VENOUS BLD VENIPUNCTURE: CPT

## 2021-10-08 PROCEDURE — 97165 OT EVAL LOW COMPLEX 30 MIN: CPT

## 2021-10-08 PROCEDURE — 99222 1ST HOSP IP/OBS MODERATE 55: CPT | Performed by: PSYCHIATRY & NEUROLOGY

## 2021-10-08 PROCEDURE — 70547 MR ANGIOGRAPHY NECK W/O DYE: CPT

## 2021-10-08 PROCEDURE — 70544 MR ANGIOGRAPHY HEAD W/O DYE: CPT

## 2021-10-08 PROCEDURE — 97161 PT EVAL LOW COMPLEX 20 MIN: CPT

## 2021-10-08 PROCEDURE — 74011250636 HC RX REV CODE- 250/636: Performed by: HOSPITALIST

## 2021-10-08 PROCEDURE — 97116 GAIT TRAINING THERAPY: CPT

## 2021-10-08 PROCEDURE — 83036 HEMOGLOBIN GLYCOSYLATED A1C: CPT

## 2021-10-08 PROCEDURE — 93306 TTE W/DOPPLER COMPLETE: CPT | Performed by: SPECIALIST

## 2021-10-08 PROCEDURE — 70551 MRI BRAIN STEM W/O DYE: CPT

## 2021-10-08 PROCEDURE — 93306 TTE W/DOPPLER COMPLETE: CPT

## 2021-10-08 RX ORDER — GUAIFENESIN 100 MG/5ML
81 LIQUID (ML) ORAL DAILY
Status: DISCONTINUED | OUTPATIENT
Start: 2021-10-08 | End: 2021-10-08 | Stop reason: HOSPADM

## 2021-10-08 RX ORDER — LABETALOL HYDROCHLORIDE 5 MG/ML
5 INJECTION, SOLUTION INTRAVENOUS
Status: DISCONTINUED | OUTPATIENT
Start: 2021-10-08 | End: 2021-10-08 | Stop reason: HOSPADM

## 2021-10-08 RX ORDER — ATORVASTATIN CALCIUM 40 MG/1
40 TABLET, FILM COATED ORAL
Qty: 30 TABLET | Refills: 0 | Status: SHIPPED | OUTPATIENT
Start: 2021-10-08 | End: 2021-10-12 | Stop reason: SDUPTHER

## 2021-10-08 RX ORDER — ACETAMINOPHEN 325 MG/1
650 TABLET ORAL
Status: DISCONTINUED | OUTPATIENT
Start: 2021-10-08 | End: 2021-10-08 | Stop reason: HOSPADM

## 2021-10-08 RX ORDER — ENOXAPARIN SODIUM 100 MG/ML
40 INJECTION SUBCUTANEOUS EVERY 24 HOURS
Status: DISCONTINUED | OUTPATIENT
Start: 2021-10-08 | End: 2021-10-08 | Stop reason: HOSPADM

## 2021-10-08 RX ORDER — AMLODIPINE BESYLATE 5 MG/1
5 TABLET ORAL DAILY
Status: DISCONTINUED | OUTPATIENT
Start: 2021-10-08 | End: 2021-10-08 | Stop reason: HOSPADM

## 2021-10-08 RX ORDER — ATORVASTATIN CALCIUM 40 MG/1
40 TABLET, FILM COATED ORAL
Status: DISCONTINUED | OUTPATIENT
Start: 2021-10-08 | End: 2021-10-08 | Stop reason: HOSPADM

## 2021-10-08 RX ORDER — GUAIFENESIN 100 MG/5ML
81 LIQUID (ML) ORAL DAILY
Qty: 30 TABLET | Refills: 0 | Status: SHIPPED | OUTPATIENT
Start: 2021-10-09 | End: 2021-11-08

## 2021-10-08 RX ORDER — ACETAMINOPHEN 650 MG/1
650 SUPPOSITORY RECTAL
Status: DISCONTINUED | OUTPATIENT
Start: 2021-10-08 | End: 2021-10-08 | Stop reason: HOSPADM

## 2021-10-08 RX ORDER — AMLODIPINE BESYLATE 10 MG/1
10 TABLET ORAL DAILY
Qty: 30 TABLET | Refills: 0 | Status: SHIPPED | OUTPATIENT
Start: 2021-10-09 | End: 2021-10-12 | Stop reason: SDUPTHER

## 2021-10-08 RX ADMIN — ENOXAPARIN SODIUM 40 MG: 40 INJECTION SUBCUTANEOUS at 10:28

## 2021-10-08 RX ADMIN — AMLODIPINE BESYLATE 5 MG: 5 TABLET ORAL at 10:28

## 2021-10-08 RX ADMIN — ASPIRIN 81 MG CHEWABLE TABLET 81 MG: 81 TABLET CHEWABLE at 10:28
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
## 2021-10-08 NOTE — PROGRESS NOTES
Speech pathology note  Reviewed chart and note patient admitted with L numbness with concern for CVA. Note CT showed No acute intracranial hemorrhage and MRI pending. Note patient passed the STAND and a regular diet was ordered. NIHSS=0. Will follow for formal evaluation if indicated pending MRI results. Thank you. Addendum: Note MRI showed \"Multifocal white matter findings suggesting mild chronic small vessel ischemic change. No acute intracranial abnormality demonstrated. \" Discussed case with PT and RN who reported no SLP concerns. Will sign off. Thank you.     Irish Rousseau., CCC-SLP

## 2021-10-08 NOTE — ROUTINE PROCESS
TRANSFER - OUT REPORT:    Verbal report given to MADY DUPREE [facility], RN (name) on Callie Abide  being transferred to 6S 664(unit) for routine progression of care       Report consisted of patients Situation, Background, Assessment and   Recommendations(SBAR). Information from the following report(s) SBAR, Kardex, ED Summary, Procedure Summary, Intake/Output, MAR, Recent Results, Med Rec Status, Cardiac Rhythm sinus rhythm and Dual Neuro Assessment was reviewed with the receiving nurse. Lines:   Peripheral IV 10/07/21 Left Antecubital (Active)        Opportunity for questions and clarification was provided.       Patient transported with:

## 2021-10-08 NOTE — PROGRESS NOTES
Transition of Care Plan   RUR- Low 7%   DISPOSITION: Home with spouse   F/U with PCP/Specialist     Transport: spouse    Medicare pt has received, reviewed, and signed 2nd IM letter informing them of their right to appeal the discharge. Signed copy has been placed on pt bedside chart. Radha Fink) ARTURO Elizabeth.

## 2021-10-08 NOTE — PROGRESS NOTES
Hospital follow-up PCP transitional care appointment has been scheduled with Dr. Belen Pierce for Tuesday, 10/12/21 at 1:20 p.m. Pending patient discharge.   Shakeel Gomes, Care Management Specialist.

## 2021-10-08 NOTE — DISCHARGE INSTRUCTIONS
Dear Shanti Avina,    Thank you for choosing [address] [place] [address] for your healthcare needs. We strive to provide EXCELLENT care to you and your family. In an effort to explain clearly why you were here in the hospital, I've also written a very brief summary below. Other details including formal diagnosis, medication changes, and follow up appointment recommendations can also be found in this packet. You were admitted for numbness due to TIA for which you were ruled out for CVA. You also received care from specialist physicians in the following specialties:  [facility]    Here are the updates to your medication list:  **Add norvasc 10mg daily for blood pressure. You will need to monitor BP at home, and follow up with PCP.   **start aspirin and atorvastatin for stroke prevention    Remember that it is important for you to take your medications exactly as they are prescribed. It is helpful to keep a list of your medication with the names, dosages, and times to be taken in your wallet. Additionally,   - Please make sure to follow up with your primary care physician within 1-2 weeks of discharge for hospital follow up. - Please make sure to continue to monitor for: Chest pain, shortness of breath, high fevers, sudden weakness, numbness, tingling, slurred speech and return to the Emergency Department with any of these symptoms.   - Please get up slowly from a seated or laying position, avoid falls. - Avoid tobacco, alcohol and other illicit drug use. - Diet restrictions: None  - Activity restrictions: None  - Wound care: None    Make sure to also see your primary care doctor for follow-up. Bring these papers with you and be sure to review your medication list with your doctor. I cannot stress the importance of follow up enough. I've included the information for your follow-up appointments below:     Follow-up Information     Follow up With Specialties Details Why Contact Info Briana Sheikh MD Internal Medicine In 1 week  [address]   [address] [address] [phone]            At this time, the following test results are still pending: None  Again, please follow-up these results with your primary care provider. Should you have any fever over 101 degrees for 24 hours, chest pain, shortness of breath, fever, chills, nausea, vomiting, diarrhea, change in mentation, falling, weakness, bleeding, or worsening pain, please seek medical attention immediately. Finally, as your discharging physician, you may be receiving a survey regarding my care. I would greatly value and appreciate your input in the survey as we strive for excellence. If you have any questions, I can be reached at [phone].   Thank you so much again for allowing me to care for you at [address] [place] [address].    Respectfully yours,  Bharti Parson MD
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
## 2021-10-08 NOTE — CONSULTS
[address]    Name:  Prudence Nurse  MR#:  [id number]  :  1950  ACCOUNT #:  [de-identified]  DATE OF SERVICE:  10/08/2021    REQUESTING PHYSICIAN:  Dr. [facility].    REASON FOR EVALUATION:  TIA. HISTORY OF PRESENT ILLNESS:  The patient is a 79-year-old male with hypertension, who is not on any medications. Other medical issues include BPH, hepatitis C, prostate cancer. He says that around 12:30 p.m. yesterday he was walking around the [facility] when he started to not feel right. He sat down and then started to notice tingling sensation on the left side of his mouth and tongue. He also had tingling in the tips of his fingers. He drove home and called PCP and was advised to go to the emergency department. Symptoms resolved shortly after he arrived here. He has never experienced any symptoms like this before. No history of stroke or heart attack. Did not have any associated headache, changes in vision, speech, or swallowing difficulties. No nausea, vomiting, chest pain, shortness of breath, or palpitations. Has remained asymptomatic since admission. PAST MEDICAL HISTORY:  As mentioned above. HOME MEDICATIONS:  1.  Meloxicam as needed. 2.  Flonase nasal spray. 3.  Ibuprofen as needed. ALLERGIES:  ACETAMINOPHEN. SOCIAL HISTORY:  He lives independently. Former smoker. No history of alcohol use. He is retired. FAMILY HISTORY:  Positive for hypertension, heart disease, and stroke in mother. PHYSICAL EXAMINATION:  GENERAL:  The patient is alert, fully oriented. VITAL SIGNS:  Blood pressure 159/84, temperature 97.9, pulse is 86. His blood pressure on admission was 208/155. NEUROLOGIC:  Pupils are equal, round, reactive. Extraocular movements are full. Face is symmetric. Tongue is midline. Hearing is baseline. Muscle tone and bulk normal.  Strength normal in both upper and lower extremities. DTRs 2/2 and symmetric. Toes downgoing. Sensation intact. Gait baseline. HEART:  Regular rate and rhythm. CHEST:  Clear. ABDOMEN:  Soft, nontender. Positive bowel sounds. EXTREMITIES:  No edema. LABORATORY DATA:  CBC is normal.  Chemistries are unremarkable. Lipid profile:  Triglycerides 156, HDL 39, . Hemoglobin A1c is 5.4. MRI scan of the brain showed mild-to-moderate small vessel ischemic changes. MRI of the head and neck did not show any large vessel stenosis or other vascular abnormality. Echocardiogram shows mildly dilated left atrium, ejection fraction is 55-60%. No intracardiac shunt. ASSESSMENT AND PLAN:  A 65-year-old male with hypertension, presenting with transient symptoms of left-sided mouth, tongue, and fingertip numbness lasting 3-4 hours. I agree that this was most likely a transient ischemic attack, possibly in the posterior circulation. A stroke workup is unremarkable. Stroke risk factors and their effects on small vessels of the brain was reviewed. Continue treatment of hypertension as appropriate. He should also continue aspirin and statin on a daily basis. Okay to discharge home from a neurological standpoint. Please call with any further questions. Thank you for this consultation.       Hilda Holt MD      [id number]  D:  10/08/2021 15:00  T:  10/08/2021 16:01  JOB #:  [id number]

## 2021-10-11 ENCOUNTER — PATIENT OUTREACH (OUTPATIENT)
Dept: CASE MANAGEMENT | Age: 71
End: 2021-10-11

## 2021-10-11 NOTE — PROGRESS NOTES
Physician Progress Note      PATIENT:               Hipolito Hammer  CSN #:                  [id number]  :                       1950  ADMIT DATE:       10/7/2021 3:16 PM  [address] [place] DATE:        10/8/2021 3:55 PM  RESPONDING  PROVIDER #:        David Garcia MD          QUERY TEXT:    Pt admitted with CVA vs TIA. Pt noted to have accelerated HTN documented. If possible, please document in progress notes and discharge summary if you are evaluating and/or treating any of the following: The medical record reflects the following:  Risk Factors: 67-year-old male with a history of BPH, hep C, hypertension, prostate cancer here with Stroke s/s. Clinical Indicators: B/P trend 208/99, 185/83, 173/84, focal weakness, trouble speaking, difficulty walking, left tongue, left lip, left arm tingling, per H&P \"Accelerated HTN,\"  Treatment: IV Hydralazine, prn iv labetolol if sbp> 180, keep sbp> 140, NEURO tele monitor, stroke work up including brain MRI, mra of neck, head, echo. A1c, flp, neuro consult. Start ASA. Hypertensive Crisis, unspecified: at least 2 consecutive readings of SBP > 180 mmHg or DBP > 110 mmHg  - Hypertensive Urgency: Hypertensive crisis w/o associated organ dysfunction. S/s may or may not be present, but can include severe headache, SOB, epistaxis, severe anxiety. Tx: adjustment of oral antihypertensives; IV meds not usually required. - Hypertensive Emergency: Hypertensive crisis w/ associated organ damage (stroke, encephalopathy, TANNA, MI, angina, acute or decompensated CHF, acute pulmonary edema, HELLP, etc.). Requires immediate treatment (usually IV meds) & possible ICU admission. Associated organ dysfunction needs documented.   [email or web address]  Options provided:  -- Hypertensive Crisis  -- Hypertensive Emergency  -- Hypertensive Urgency  -- Other - I will add my own diagnosis  -- Disagree - Not applicable / Not valid  -- Disagree - Clinically unable to determine / Unknown  -- Refer to Clinical Documentation Reviewer    PROVIDER RESPONSE TEXT:    This patient is in hypertensive emergency. Query created by:  Alexander Manrique on 10/8/2021 7:18 AM      Electronically signed by:  Sharon Mathias MD 10/11/2021 8:09 AM

## 2021-10-11 NOTE — PROGRESS NOTES
Care Transitions Initial Call    Call within 2 business days of discharge: Yes     Patient: Germania Beyer Patient : 1950 MRN: [id number]    Last Discharge [address]       Complaint Diagnosis Description Type Department Provider    10/7/21 Extremity Weakness Left arm numbness . .. ED to Hosp-Admission (Discharged) (ADMIT) [id number] Sean Burch MD; Gel. .. Was this an external facility discharge? No Discharge Facility: n/a    Challenges to be reviewed by the provider   Additional needs identified to be addressed with provider: yes     Patient is monitoring BP at home, requested he keep log to share with provider. Discussed COVID-19 related testing which was not done at this time. Test results were not done. Patient informed of results, if available? n/a     Advance Care Planning:   Does patient have an Advance Directive:  yes; reviewed and current     Inpatient Readmission Risk score: Unplanned Readmit Risk Score: 7    Was this a readmission? no   Patient stated reason for the admission: left sided numbness     Patients top risk factors for readmission: none noted   Interventions to address risk factors: Scheduled appointment with PCP-10/12 and Obtained and reviewed discharge summary and/or continuity of care documents    Care Transition Nurse (CTN) contacted the patient by telephone to perform post hospital discharge assessment. Verified name and  with patient as identifiers. Provided introduction to self, and explanation of the CTN role. CTN reviewed discharge instructions, medical action plan and red flags with patient who verbalized understanding. Were discharge instructions available to patient? yes. Reviewed appropriate site of care based on symptoms and resources available to patient including: PCP. Patient given an opportunity to ask questions and does not have any further questions or concerns at this time.  The patient agrees to contact the PCP office for questions related to their healthcare. Medication reconciliation was performed with patient, who verbalizes understanding of administration of home medications. Referral to Pharm D needed: no     Home Health/Outpatient orders at discharge: none    Durable Medical Equipment ordered at discharge: None    Was patient discharged with a pulse oximeter? No.    Discussed follow-up appointments. If no appointment was previously scheduled, appointment scheduling offered: n/a. Is follow up appointment scheduled within 7 days of discharge? yes. [facility] follow up appointment(s):   Future Appointments   Date Time Provider Carlos Manuel Webb   10/12/2021  1:20 PM MD CAMILLE Zarate [facility] follow up appointment(s): none    Plan for follow-up call in 7-10 days based on severity of symptoms and risk factors. Plan for next call: review stroke risk factors, follow up appointment-attend as directed  CTN provided contact information for future needs. Goals Addressed                 This Visit's Progress     Prevent complications post hospitalization. 10/11/21  Attend follow up as directed-  [facility] follow up appointment(s):   Future Appointments   Date Time Provider Carlos Manuel Webb   10/12/2021  1:20 PM Elke Monroy MD [facility]     Non-[facility] follow up appointment(s): none     B.E F.A. S. T-balance, eyes, facial droop, arm weakness, speech difficulty, and time (call 911) have visible on refrigerator.    Managing risk factors- hypertension, maintain low fat diet

## 2021-10-12 ENCOUNTER — OFFICE VISIT (OUTPATIENT)
Dept: INTERNAL MEDICINE CLINIC | Age: 71
End: 2021-10-12
Payer: MEDICARE

## 2021-10-12 VITALS
HEART RATE: 77 BPM | DIASTOLIC BLOOD PRESSURE: 72 MMHG | BODY MASS INDEX: 26.54 KG/M2 | TEMPERATURE: 97.1 F | SYSTOLIC BLOOD PRESSURE: 133 MMHG | HEIGHT: 71 IN | RESPIRATION RATE: 16 BRPM | OXYGEN SATURATION: 95 % | WEIGHT: 189.6 LBS

## 2021-10-12 DIAGNOSIS — G45.9 TIA (TRANSIENT ISCHEMIC ATTACK): Primary | ICD-10-CM

## 2021-10-12 DIAGNOSIS — I10 BENIGN HYPERTENSION: ICD-10-CM

## 2021-10-12 PROBLEM — M11.20 PSEUDOGOUT: Status: ACTIVE | Noted: 2021-10-12

## 2021-10-12 PROBLEM — I63.9 CVA (CEREBRAL VASCULAR ACCIDENT) (HCC): Status: RESOLVED | Noted: 2021-10-07 | Resolved: 2021-10-12

## 2021-10-12 PROCEDURE — 99496 TRANSJ CARE MGMT HIGH F2F 7D: CPT | Performed by: INTERNAL MEDICINE

## 2021-10-12 PROCEDURE — G8427 DOCREV CUR MEDS BY ELIG CLIN: HCPCS | Performed by: INTERNAL MEDICINE

## 2021-10-12 RX ORDER — AMLODIPINE BESYLATE 10 MG/1
10 TABLET ORAL DAILY
Qty: 90 TABLET | Refills: 1 | Status: SHIPPED | OUTPATIENT
Start: 2021-10-12 | End: 2021-11-03 | Stop reason: SDUPTHER

## 2021-10-12 RX ORDER — DICLOFENAC SODIUM 50 MG/1
50 TABLET, DELAYED RELEASE ORAL 2 TIMES DAILY
COMMUNITY
Start: 2021-08-10 | End: 2022-01-11 | Stop reason: SDUPTHER

## 2021-10-12 RX ORDER — ATORVASTATIN CALCIUM 40 MG/1
40 TABLET, FILM COATED ORAL
Qty: 90 TABLET | Refills: 1 | Status: SHIPPED | OUTPATIENT
Start: 2021-10-12 | End: 2021-11-03 | Stop reason: SDUPTHER

## 2021-10-12 NOTE — PROGRESS NOTES
Assessment/Plan:     1. TIA (transient ischemic attack)  -numbness of left hand and face resolved. -continue aspirin and atorvastatin daily. Both refilled today. 2. Benign hypertension  -controlled now on amlodipine 10mg daily. Refilled today. Follow-up Disposition:     follow up in 4 months. He is also followed at the [facility].  He prefers to stagger his visits with my visits every 6 months. Disclaimer:  Return if symptoms worsen or fail to improve. Advised patient to call back or return to office if symptoms worsen/change/persist.     Discussed expected course/resolution/complications of diagnosis in detail with patient. Medication risks/benefits/costs/interactions/alternatives discussed with patient. Patient was given an after visit summary which includes diagnoses, current medications, & vitals. Patient expressed understanding with the diagnosis and plan. Subjective:      Yves Dia is a 66 y.o. male who presents today for transition of care. Admission date: 10/7/2021  Discharge date: 10/8/2021  Discharge Diagnosis: TIA  Hospital: [facility]  Date of Navigator contact: 10/11/2021    Hospital Course:  Patient was admitted for left sided numbness and admitted for CVA work up. His blood pressure was noted to be persistently elevated. He was started on amlodipine and bp was controlled prior to discharge. He was also started on an ASA and atorvastatin 40mg daily. Procedures:  10/07/21    ECHO ADULT COMPLETE 10/08/2021 10/8/2021    Interpretation Summary  · Saline contrast was given to evaluate for intracardiac shunt. · LV: Estimated LVEF is 55 - 60%. Normal cavity size, wall thickness and systolic function (ejection fraction normal). Age-appropriate left ventricular diastolic function. · LA: Mildly dilated left atrium. · RV: Not well visualized. · IAS: No atrial septal defect present. Agitated saline contrast study was performed.   · MV: Mitral valve non-specific thickening. · AO: Mild aortic root dilatation. Signed by: Shaan Tate MD on 10/8/2021  1:52 PM    MRI Results (most recent):  Results from Hospital Encounter encounter on 10/07/21    MRA NECK WO CONT    Narrative  EXAM:  MRA NECK WO CONT, MRA BRAIN WO CONT  INDICATION:  cva  TECHNIQUE:  Axial 3-D time-of-flight MR angiography was performed of the cranial base and  proximal intracranial vessels. Additional 2-D time-of-flight MR angiography was  performed from the arch up to the skull base. Axial 3-D time-of-flight centered  on the carotid bifurcations. MIP reconstructions were performed of all data  sets. COMPARISON: CT  FINDINGS:  Limited evaluation of origins brachycephalic arteries. Otherwise, proximal  common carotid arteries and vertebral arteries have normal antegrade flow  without findings of obstruction or stenosis. Carotid bifurcations are well defined and both carotids have 0% stenosis by  NASCET criteria. External carotid arteries are patent. The cervical internal  carotid arteries are also unremarkable other than mild tortuosity. Carotid siphons are well-maintained. There is relatively symmetric flow in  middle and anterior cerebral arteries. The left vertebral artery is dominant. Both vertebral arteries are adequately  maintained throughout the cervical course and supply the basilar artery. The  basilar artery is normal and supplies the right posterior cerebral artery with a  fetal origin to the left posterior cerebral artery. Impression  No significant stenosis or other abnormality demonstrated in the arteries of the  neck and head.          Objective:       Visit Vitals  /72   Pulse 77   Temp 97.1 °F (36.2 °C) (Temporal)   Resp 16   Ht 5' 11\" (1.803 m)   Wt 189 lb 9.6 oz (86 kg)   SpO2 95%   BMI 26.44 kg/m²     General appearance: alert, cooperative, no distress, appears stated age  Head: Normocephalic, without obvious abnormality, atraumatic  Neck: supple, symmetrical, trachea midline, no adenopathy, no carotid bruit and no JVD  Lungs: clear to auscultation bilaterally  Heart: regular rate and rhythm, S1, S2 normal, no murmur, click, rub or gallop  Extremities: extremities normal, atraumatic, no cyanosis or edema  Pulses: 2+ and symmetric

## 2021-10-19 ENCOUNTER — PATIENT OUTREACH (OUTPATIENT)
Dept: CASE MANAGEMENT | Age: 71
End: 2021-10-19

## 2021-10-19 NOTE — PROGRESS NOTES
Reviewed chart today. Goals      Prevent complications post hospitalization. 10/11/21  Attend follow up as directed-  [facility]   Future Appointments   Date Time Provider Carlos Manuel Almagueri   10/12/2021  1:20 PM Aly Monroy MD [facility]     Non-[facility] follow up appointment(s): none     B.E F.A. S. T-balance, eyes, facial droop, arm weakness, speech difficulty, and time (call 911) have visible on refrigerator.  Managing risk factors- hypertension, maintain low fat diet    10/19/21  Per EMR, patient attended PCP follow up on 10/12. Notes reviewed symptoms resolved, no medication changes.

## 2022-01-03 RX ORDER — LISINOPRIL 20 MG/1
20 TABLET ORAL DAILY
Qty: 30 TABLET | Refills: 1 | Status: SHIPPED | OUTPATIENT
Start: 2022-01-03 | End: 2022-02-14 | Stop reason: DRUGHIGH

## 2022-01-10 ENCOUNTER — PATIENT MESSAGE (OUTPATIENT)
Dept: INTERNAL MEDICINE CLINIC | Age: 72
End: 2022-01-10

## 2022-01-11 RX ORDER — DICLOFENAC SODIUM 50 MG/1
50 TABLET, DELAYED RELEASE ORAL
Qty: 180 TABLET | Refills: 0 | Status: SHIPPED | OUTPATIENT
Start: 2022-01-11 | End: 2022-04-08

## 2022-01-11 NOTE — TELEPHONE ENCOUNTER
From: Luis Alberto Mccormack  To: Margaret Stockton MD  Sent: 1/10/2022 3:59 PM EST  Subject: Diclofenac sod ec 50 mg    Can you write me a perception for diclofenac to treat  my pseudo gout? I have one from [facility] that is about to run out.   Thanks   Marcelina Angel

## 2022-02-14 ENCOUNTER — PATIENT OUTREACH (OUTPATIENT)
Dept: CASE MANAGEMENT | Age: 72
End: 2022-02-14

## 2022-02-14 ENCOUNTER — OFFICE VISIT (OUTPATIENT)
Dept: INTERNAL MEDICINE CLINIC | Age: 72
End: 2022-02-14
Payer: MEDICARE

## 2022-02-14 VITALS
WEIGHT: 183 LBS | HEART RATE: 60 BPM | SYSTOLIC BLOOD PRESSURE: 162 MMHG | BODY MASS INDEX: 25.62 KG/M2 | TEMPERATURE: 97.1 F | RESPIRATION RATE: 16 BRPM | OXYGEN SATURATION: 96 % | DIASTOLIC BLOOD PRESSURE: 84 MMHG | HEIGHT: 71 IN

## 2022-02-14 DIAGNOSIS — Z00.00 MEDICARE ANNUAL WELLNESS VISIT, SUBSEQUENT: Primary | ICD-10-CM

## 2022-02-14 DIAGNOSIS — C61 PROSTATE CANCER (HCC): ICD-10-CM

## 2022-02-14 DIAGNOSIS — E78.2 MIXED HYPERLIPIDEMIA: ICD-10-CM

## 2022-02-14 DIAGNOSIS — I10 BENIGN HYPERTENSION: ICD-10-CM

## 2022-02-14 DIAGNOSIS — Z13.31 SCREENING FOR DEPRESSION: ICD-10-CM

## 2022-02-14 DIAGNOSIS — Z79.899 ENCOUNTER FOR LONG-TERM (CURRENT) USE OF MEDICATIONS: ICD-10-CM

## 2022-02-14 PROCEDURE — G8510 SCR DEP NEG, NO PLAN REQD: HCPCS | Performed by: INTERNAL MEDICINE

## 2022-02-14 PROCEDURE — G0463 HOSPITAL OUTPT CLINIC VISIT: HCPCS | Performed by: INTERNAL MEDICINE

## 2022-02-14 PROCEDURE — G8536 NO DOC ELDER MAL SCRN: HCPCS | Performed by: INTERNAL MEDICINE

## 2022-02-14 PROCEDURE — 99213 OFFICE O/P EST LOW 20 MIN: CPT | Performed by: INTERNAL MEDICINE

## 2022-02-14 PROCEDURE — G0439 PPPS, SUBSEQ VISIT: HCPCS | Performed by: INTERNAL MEDICINE

## 2022-02-14 PROCEDURE — 3017F COLORECTAL CA SCREEN DOC REV: CPT | Performed by: INTERNAL MEDICINE

## 2022-02-14 PROCEDURE — G8754 DIAS BP LESS 90: HCPCS | Performed by: INTERNAL MEDICINE

## 2022-02-14 PROCEDURE — G8427 DOCREV CUR MEDS BY ELIG CLIN: HCPCS | Performed by: INTERNAL MEDICINE

## 2022-02-14 PROCEDURE — G8419 CALC BMI OUT NRM PARAM NOF/U: HCPCS | Performed by: INTERNAL MEDICINE

## 2022-02-14 PROCEDURE — G8753 SYS BP > OR = 140: HCPCS | Performed by: INTERNAL MEDICINE

## 2022-02-14 PROCEDURE — 1101F PT FALLS ASSESS-DOCD LE1/YR: CPT | Performed by: INTERNAL MEDICINE

## 2022-02-14 RX ORDER — LISINOPRIL 40 MG/1
40 TABLET ORAL DAILY
Qty: 30 TABLET | Refills: 1 | Status: SHIPPED | OUTPATIENT
Start: 2022-02-14 | End: 2022-03-16 | Stop reason: SDUPTHER

## 2022-02-14 RX ORDER — ASPIRIN 81 MG/1
TABLET ORAL DAILY
COMMUNITY

## 2022-02-14 NOTE — PROGRESS NOTES
Ambulatory Care Management Note    Date/Time:  2/14/2022 11:44 AM    This patient was received as a referral from Provider. Ambulatory Care Manager outreached to patient today to offer care management services. Introduction to self and role of care manager provided. Patient accepted care management services at this time. Follow up call scheduled at this time. Patient has Ambulatory Care Manager's contact number for for any questions or concerns. Remote Patient Monitoring In Office Enrollment/Set Up Note      Date/Time:  2/14/2022 1:10 PM    Offered patient enrollment in the [facility] Remote Patient Monitoring (RPM) program for in home monitoring for HTN. Patient accepted RPM services. Patient will be monitoring activity, blood pressure , weight and survey questions daily. ACM reviewed the information below with patient:     Emergency Contact: Mariano Riggs  [phone]    [x] RPM kit provided to patient during office encounter          [x] Determined BP cuff side.                                                   [x] Hours of ACM monitoring Monday-Friday [id number]                  [x] Patient received all RPM equipment (tablet, scale, blood pressure device and cuff, and pulse oximeter)              [x] Instructed patient keep box for use when returning equipment                                                          [x] Reviewed Patient Welcome Letter with patient                         [x] Patient signed consent on tablet   [x] Assisted patient in setting up tablet and blue tooth devices             [x] Instructed patient to keep scale on flat surface                                                         [x] Instructed patient to keep tablet plugged in at all times                         [x] Reviewed tablet setting with patient   [x] Instructed how to contact IT support from tablet   [x] Provided Remote Patient Monitoring ACM contact information                  All questions answered at this time.

## 2022-02-14 NOTE — PROGRESS NOTES
Verified name and birth date for privacy precautions. Chart reviewed in preparation for today's visit.      Chief Complaint   Patient presents with   Arelis  Annual Wellness Visit          Health Maintenance Due   Topic    Shingrix Vaccine Age 49> (1 of 2)    AAA Screening 73-69 YO Male Smoking Patients     Medicare Yearly Exam     Depression Screen          Wt Readings from Last 3 Encounters:   02/14/22 183 lb (83 kg)   10/12/21 189 lb 9.6 oz (86 kg)   10/08/21 185 lb (83.9 kg)     Temp Readings from Last 3 Encounters:   02/14/22 97.1 °F (36.2 °C) (Temporal)   10/12/21 97.1 °F (36.2 °C) (Temporal)   10/08/21 97.9 °F (36.6 °C)     BP Readings from Last 3 Encounters:   02/14/22 (!) 162/84   10/12/21 133/72   10/08/21 (!) 159/84     Pulse Readings from Last 3 Encounters:   02/14/22 60   10/12/21 77   10/08/21 86         Learning Assessment:  :     Learning Assessment 8/6/2018 9/18/2017 10/10/2014   PRIMARY LEARNER Patient Patient Patient   HIGHEST LEVEL OF EDUCATION - PRIMARY LEARNER  2 YEARS OF COLLEGE - SOME COLLEGE   BARRIERS PRIMARY LEARNER NONE - NONE   CO-LEARNER CAREGIVER - - No   PRIMARY LANGUAGE ENGLISH ENGLISH ENGLISH    NEED - - No   LEARNER PREFERENCE PRIMARY DEMONSTRATION LISTENING OTHER (COMMENT)   LEARNING SPECIAL TOPICS - - no   ANSWERED BY patient paitient  patient   RELATIONSHIP SELF SELF SELF       Depression Screening:  :     3 most recent PHQ Screens 2/14/2022   Little interest or pleasure in doing things Not at all   Feeling down, depressed, irritable, or hopeless Not at all   Total Score PHQ 2 0   Trouble falling or staying asleep, or sleeping too much Not at all   Feeling tired or having little energy Not at all   Poor appetite, weight loss, or overeating Not at all   Feeling bad about yourself - or that you are a failure or have let yourself or your family down Not at all   Trouble concentrating on things such as school, work, reading, or watching TV Not at all   Moving or speaking so slowly that other people could have noticed; or the opposite being so fidgety that others notice Not at all   Thoughts of being better off dead, or hurting yourself in some way Not at all   PHQ 9 Score 0   How difficult have these problems made it for you to do your work, take care of your home and get along with others Not difficult at all       Fall Risk Assessment:  :     Fall Risk Assessment, last 12 mths 2/14/2022   Able to walk? Yes   Fall in past 12 months? 0   Do you feel unsteady? 0   Are you worried about falling 0   Number of falls in past 12 months -   Fall with injury? -       Abuse Screening:  :     Abuse Screening Questionnaire 2/14/2022 8/6/2018 1/2/2018 10/10/2014   Do you ever feel afraid of your partner? N N N N   Are you in a relationship with someone who physically or mentally threatens you? N N N N   Is it safe for you to go home?  Adin Teague
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
## 2022-02-14 NOTE — PATIENT INSTRUCTIONS
Medicare Wellness Visit, Male    The best way to live healthy is to have a lifestyle where you eat a well-balanced diet, exercise regularly, limit alcohol use, and quit all forms of tobacco/nicotine, if applicable. Regular preventive services are another way to keep healthy. Preventive services (vaccines, screening tests, monitoring & exams) can help personalize your care plan, which helps you manage your own care. Screening tests can find health problems at the earliest stages, when they are easiest to treat. [facility] al[facility] follows the current, evidence-based guidelines published by the [facility] ([facility]) when recommending preventive services for our patients. Because we follow these guidelines, sometimes recommendations change over time as research supports it. (For example, a prostate screening blood test is no longer routinely recommended for men with no symptoms). Of course, you and your doctor may decide to screen more often for some diseases, based on your risk and co-morbidities (chronic disease you are already diagnosed with). Preventive services for you include:  - Medicare offers their members a free annual wellness visit, which is time for you and your primary care provider to discuss and plan for your preventive service needs. Take advantage of this benefit every year!  -All adults over age 72 should receive the recommended pneumonia vaccines. Current USPSTF guidelines recommend a series of two vaccines for the best pneumonia protection.   -All adults should have a flu vaccine yearly and tetanus vaccine every 10 years.  -All adults age 48 and older should receive the shingles vaccines (series of two vaccines).        -All adults age 38-68 who are overweight should have a diabetes screening test once every three years.   -Other screening tests & preventive services for persons with diabetes include: an eye exam to screen for diabetic retinopathy, a kidney function test, a foot exam, and stricter control over your cholesterol.   -Cardiovascular screening for adults with routine risk involves an electrocardiogram (ECG) at intervals determined by the provider.   -Colorectal cancer screening should be done for adults age 54-65 with no increased risk factors for colorectal cancer. There are a number of acceptable methods of screening for this type of cancer. Each test has its own benefits and drawbacks. Discuss with your provider what is most appropriate for you during your annual wellness visit. The different tests include: colonoscopy (considered the best screening method), a fecal occult blood test, a fecal DNA test, and sigmoidoscopy.  -All adults born between [facility] should be screened once for Hepatitis C.  -An Abdominal Aortic Aneurysm (AAA) Screening is recommended for men age 73-68 who has ever smoked in their lifetime.

## 2022-02-22 ENCOUNTER — PATIENT OUTREACH (OUTPATIENT)
Dept: CASE MANAGEMENT | Age: 72
End: 2022-02-22

## 2022-02-22 NOTE — PROGRESS NOTES
2/22/22: Called and LM requesting return call. 2/23/22: Patient is currently enrolled in the [facility] Patient Monitoring program for management of HTN. Patient is checking BP multiple times 1-2 minutes apart. Patient is checking multiple times to provide an average. Reports experiencing an episode yesterday where he felt jittery and weak. Had not eaten lunch when the feeling started and it slowly improved after eating. Inquired how much water patient is drinking daily and he reports maybe 16-32 oz. Encourage 32-48 oz spread out over the course of the day. Patient will try to increase intake. States since prostate cancer, the more he drinks the more he voids. Patient agrees to increase fluids and if he has a repeat experience of jitters or weakness, he will check BP.   ACM will continue to monitor BP.    2/22/22: 116/71/66 (0656 am)  120/69/69 (0658 am)  115/68/66 (0700)    2/21/22: 130/72/67 (7123) 127/66/65 (5461)  124/71/61 (5271) 129/68/69 (1457)  117/65/70 (1459)  120/66/70 (1501)    2/20/22: 118/68/68 (0710) 116/67/65(0500) 116/67//66 (7916)  113/66/68 (1453) 114/66/68 (1454)  119/65/68 (1456)    2/19/22:   130/74/64 (1415)  132/74/63  (1412)  139/78/65  (1408)  141/82/66 (1406)  121/73/68 (0722)  117/68/64 (0720)  126/76/70  (7266)     2/18/22 2/17/22    124/74/63   125/67/79  128/73/63   126/67/ 80   129/70/64   128/71/84  147/73/65   119/73/66  132/76/69   127/73/72  124/74/67  131/73/69

## 2022-03-02 ENCOUNTER — PATIENT OUTREACH (OUTPATIENT)
Dept: CASE MANAGEMENT | Age: 72
End: 2022-03-02

## 2022-03-16 ENCOUNTER — OFFICE VISIT (OUTPATIENT)
Dept: INTERNAL MEDICINE CLINIC | Age: 72
End: 2022-03-16
Payer: MEDICARE

## 2022-03-16 VITALS
HEART RATE: 74 BPM | OXYGEN SATURATION: 98 % | SYSTOLIC BLOOD PRESSURE: 125 MMHG | TEMPERATURE: 97.1 F | HEIGHT: 71 IN | RESPIRATION RATE: 15 BRPM | BODY MASS INDEX: 26.65 KG/M2 | DIASTOLIC BLOOD PRESSURE: 80 MMHG | WEIGHT: 190.4 LBS

## 2022-03-16 DIAGNOSIS — I10 BENIGN HYPERTENSION: Primary | ICD-10-CM

## 2022-03-16 PROCEDURE — G8432 DEP SCR NOT DOC, RNG: HCPCS | Performed by: INTERNAL MEDICINE

## 2022-03-16 PROCEDURE — 3017F COLORECTAL CA SCREEN DOC REV: CPT | Performed by: INTERNAL MEDICINE

## 2022-03-16 PROCEDURE — 99213 OFFICE O/P EST LOW 20 MIN: CPT | Performed by: INTERNAL MEDICINE

## 2022-03-16 PROCEDURE — G8427 DOCREV CUR MEDS BY ELIG CLIN: HCPCS | Performed by: INTERNAL MEDICINE

## 2022-03-16 PROCEDURE — G8419 CALC BMI OUT NRM PARAM NOF/U: HCPCS | Performed by: INTERNAL MEDICINE

## 2022-03-16 PROCEDURE — 1101F PT FALLS ASSESS-DOCD LE1/YR: CPT | Performed by: INTERNAL MEDICINE

## 2022-03-16 PROCEDURE — G8754 DIAS BP LESS 90: HCPCS | Performed by: INTERNAL MEDICINE

## 2022-03-16 PROCEDURE — G8752 SYS BP LESS 140: HCPCS | Performed by: INTERNAL MEDICINE

## 2022-03-16 PROCEDURE — G8536 NO DOC ELDER MAL SCRN: HCPCS | Performed by: INTERNAL MEDICINE

## 2022-03-16 PROCEDURE — G0463 HOSPITAL OUTPT CLINIC VISIT: HCPCS | Performed by: INTERNAL MEDICINE

## 2022-03-16 RX ORDER — LISINOPRIL 40 MG/1
40 TABLET ORAL DAILY
Qty: 90 TABLET | Refills: 1 | Status: SHIPPED | OUTPATIENT
Start: 2022-03-16 | End: 2022-10-06

## 2022-03-16 NOTE — PROGRESS NOTES
Assessment/Plan:     1. Benign hypertension  -wll controlled. Will continue lisinopril 40mg daily. 2. History of prostate cancer  -psa is rising, under surveillance with urology. Orders Placed This Encounter    lisinopriL (PRINIVIL, ZESTRIL) 40 mg tablet     Sig: Take 1 Tablet by mouth daily. Dispense:  90 Tablet     Refill:  1      Follow-up Disposition:     Follow up in 6 months         Disclaimer:  Return if symptoms worsen or fail to improve. Advised patient to call back or return to office if symptoms worsen/change/persist.     Discussed expected course/resolution/complications of diagnosis in detail with patient. Medication risks/benefits/costs/interactions/alternatives discussed with patient. Patient was given an after visit summary which includes diagnoses, current medications, & vitals. Patient expressed understanding with the diagnosis and plan. Subjective:      Silva Prader is a 66 y.o. male who presents today for follow up of his hypertension.       -lisinopril dose was increased to 40mg daily on 2/14/2022.  -he is enrolled with our home monitoring program. Home readings show systolic in the 858-441 range.     -had follow up with his urologist, Dr. Kathy Franks, last week. psa up to 0.4. Continuing surveillance. Has follow up in 6 months.      Objective:       Visit Vitals  /80   Pulse 74   Temp 97.1 °F (36.2 °C) (Temporal)   Resp 15   Ht 5' 11\" (1.803 m)   Wt 190 lb 6.4 oz (86.4 kg)   SpO2 98%   BMI 26.56 kg/m²     General appearance: alert, cooperative, no distress, appears stated age  Head: Normocephalic, without obvious abnormality, atraumatic  Neck: supple, symmetrical, trachea midline, no adenopathy, no carotid bruit and no JVD  Lungs: clear to auscultation bilaterally  Heart: regular rate and rhythm, S1, S2 normal, no murmur, click, rub or gallop

## 2022-03-18 PROBLEM — Z78.9 ADVANCE DIRECTIVE IN CHART: Status: ACTIVE | Noted: 2017-03-22

## 2022-03-19 PROBLEM — G45.9 TIA (TRANSIENT ISCHEMIC ATTACK): Status: ACTIVE | Noted: 2021-10-08

## 2022-03-19 PROBLEM — C43.62 MALIGNANT MELANOMA OF LEFT UPPER EXTREMITY INCLUDING SHOULDER (HCC): Status: ACTIVE | Noted: 2020-06-05

## 2022-03-19 PROBLEM — M11.20 PSEUDOGOUT: Status: ACTIVE | Noted: 2021-10-12

## 2022-03-20 PROBLEM — R76.8 HCV ANTIBODY POSITIVE: Status: ACTIVE | Noted: 2017-09-20

## 2022-03-29 ENCOUNTER — PATIENT OUTREACH (OUTPATIENT)
Dept: CASE MANAGEMENT | Age: 72
End: 2022-03-29

## 2022-03-29 NOTE — PROGRESS NOTES
Patient is currently enrolled in the [address] [place] [address] Patient Monitoring program for management of HTN. Patient has been participating in remote patient monitoring since 2/14/22.   Readings:  2022-03-29  120/70/62 (7:40 AM)    124/69/62 (7:36 AM)    125/71/62 (7:34 AM)    2022-03-28  120/70/63 (7:22 AM)    123/72/64 (7:20 AM)    126/68/65 (7:17 AM)    108/72/66 (7:16 AM)    2022-03-27  122/76/65 (7:04 AM)    127/75/65 (7:01 AM)    123/70/66 (6:57 AM)    2022-03-26  120/66/62 (7:24 AM)    113/68/64 (7:22 AM)    120/70/65 (7:18 AM)    2022-03-25  133/76/66 (3:54 PM)    150/79/66 (3:52 PM)    132/76/68 (3:49 PM)    139/76/65 (3:46 PM)    147/84/73 (3:43 PM)    146/80/71 (3:42 PM)    128/73/61 (7:16 AM)    120/72/63 (7:12 AM)    133/74/63 (7:10 AM)  2022-03-24  127/68/72 (3:40 PM)    128/69/71 (3:38 PM)    129/73/76 (3:36 PM)    125/71/65 (7:49 AM)    124/68/64 (7:46 AM)    125/72/69 (7:42 AM)    2022-03-23  139/74/56 (5:01 PM)    147/77/58 (4:59 PM)    124/74/63 (7:13 AM)    134/78/64 (7:09 AM)    126/75/66 (7:07 AM)    2022-03-22  128/67/60 (3:53 PM)    130/70/62 (3:52 PM)    126/71/63 (3:50 PM)    127/72/60 (7:08 AM)    123/76/64 (7:05 AM)    131/71/65 (6:59 AM)    2022-03-21  123/66/73 (5:05 PM)    111/69/77 (5:03 PM)    124/69/75 (5:01 PM)    115/70/63 (7:11 AM)    125/74/66 (7:09 AM)    133/73/64 (7:05 AM)    2022-03-20  140/71/67 (2:50 PM)    140/75/69 (2:47 PM)    136/73/71 (2:45 PM)    2022-03-19 2022-03-18  120/71/67 (7:21 AM)    121/70/68 (7:19 AM)    128/70/69 (7:16 AM)    2022-03-17  125/66/62 (4:16 PM)    128/65/61 (4:14 PM)    125/63/63 (4:12 PM)    128/74/64 (7:51 AM)    131/76/65 (7:47 AM)    132/76/67 (7:43 AM)    2022-03-16  132/67/69 (4:47 PM)    151/70/72 (4:42 PM)    137/72/72 (4:44 PM)    136/70/71 (4:43 PM)    120/69/64 (7:30 AM)    117/71/67 (7:27 AM)    128/70/71 (7:24 AM)    2022-03-15  116/57/67 (4:24 PM)    126/63/67 (4:22 PM)    121/62/69 (4:19 PM)    115/65/61 (7:21 AM)    120/69/63 (7:17 AM)    123/72/66 (7:14 AM)    Ranges:  Outliers: 529//09    Reviewed readings with BSD. Remote Patient Monitoring Graduation      Date/Time:  4/4/2022 10:55 AM    Patient has graduated from the Remote Patient Monitoring program on 4/4/2022 RPM goals have been met at this time. Patient as been provided instruction on process to return RPM equipment and RPM has been deactivated. Patient has Ambulatory Care Manager's contact information for any further questions, concerns, or needs.

## 2022-04-08 RX ORDER — DICLOFENAC SODIUM 50 MG/1
50 TABLET, DELAYED RELEASE ORAL
Qty: 180 TABLET | Refills: 0 | Status: SHIPPED | OUTPATIENT
Start: 2022-04-08 | End: 2022-07-10

## 2022-06-17 ENCOUNTER — PATIENT OUTREACH (OUTPATIENT)
Dept: CASE MANAGEMENT | Age: 72
End: 2022-06-17

## 2022-07-10 RX ORDER — DICLOFENAC SODIUM 50 MG/1
50 TABLET, DELAYED RELEASE ORAL
Qty: 180 TABLET | Refills: 0 | Status: SHIPPED | OUTPATIENT
Start: 2022-07-10

## 2022-08-29 ENCOUNTER — TRANSCRIBE ORDER (OUTPATIENT)
Dept: SCHEDULING | Age: 72
End: 2022-08-29

## 2022-08-29 DIAGNOSIS — C61 PROSTATE CANCER (HCC): Primary | ICD-10-CM

## 2022-08-30 ENCOUNTER — TRANSCRIBE ORDER (OUTPATIENT)
Dept: SCHEDULING | Age: 72
End: 2022-08-30

## 2022-08-30 DIAGNOSIS — C61 MALIGNANT NEOPLASM OF PROSTATE (HCC): Primary | ICD-10-CM

## 2022-09-09 ENCOUNTER — HOSPITAL ENCOUNTER (OUTPATIENT)
Dept: CT IMAGING | Age: 72
Discharge: HOME | End: 2022-09-09
Payer: MEDICARE

## 2022-09-09 ENCOUNTER — HOSPITAL ENCOUNTER (OUTPATIENT)
Dept: NUCLEAR MEDICINE | Age: 72
Discharge: HOME | End: 2022-09-09
Payer: MEDICARE

## 2022-09-09 DIAGNOSIS — C61 PROSTATE CANCER (HCC): ICD-10-CM

## 2022-09-09 DIAGNOSIS — C61 MALIGNANT NEOPLASM OF PROSTATE (HCC): ICD-10-CM

## 2022-09-09 LAB — CREAT BLD-MCNC: 0.8 MG/DL (ref 0.6–1.3)

## 2022-09-09 PROCEDURE — 78306 BONE IMAGING WHOLE BODY: CPT

## 2022-09-09 PROCEDURE — 82565 ASSAY OF CREATININE: CPT

## 2022-09-09 PROCEDURE — 74177 CT ABD & PELVIS W/CONTRAST: CPT

## 2022-09-09 PROCEDURE — 74011000636 HC RX REV CODE- 636: Performed by: RADIOLOGY

## 2022-09-09 RX ADMIN — IOPAMIDOL 100 ML: 755 INJECTION, SOLUTION INTRAVENOUS at 11:27

## 2022-09-16 ENCOUNTER — OFFICE VISIT (OUTPATIENT)
Dept: INTERNAL MEDICINE CLINIC | Age: 72
End: 2022-09-16
Payer: MEDICARE

## 2022-09-16 VITALS
SYSTOLIC BLOOD PRESSURE: 135 MMHG | HEART RATE: 59 BPM | HEIGHT: 71 IN | WEIGHT: 185.8 LBS | RESPIRATION RATE: 20 BRPM | TEMPERATURE: 97.8 F | OXYGEN SATURATION: 98 % | BODY MASS INDEX: 26.01 KG/M2 | DIASTOLIC BLOOD PRESSURE: 75 MMHG

## 2022-09-16 DIAGNOSIS — Z12.11 COLON CANCER SCREENING: ICD-10-CM

## 2022-09-16 DIAGNOSIS — C61 PROSTATE CANCER (HCC): ICD-10-CM

## 2022-09-16 DIAGNOSIS — E78.2 MIXED HYPERLIPIDEMIA: ICD-10-CM

## 2022-09-16 DIAGNOSIS — Z79.899 ENCOUNTER FOR LONG-TERM (CURRENT) USE OF MEDICATIONS: ICD-10-CM

## 2022-09-16 DIAGNOSIS — I10 BENIGN HYPERTENSION: Primary | ICD-10-CM

## 2022-09-16 PROCEDURE — 3017F COLORECTAL CA SCREEN DOC REV: CPT | Performed by: INTERNAL MEDICINE

## 2022-09-16 PROCEDURE — G0463 HOSPITAL OUTPT CLINIC VISIT: HCPCS | Performed by: INTERNAL MEDICINE

## 2022-09-16 PROCEDURE — 1101F PT FALLS ASSESS-DOCD LE1/YR: CPT | Performed by: INTERNAL MEDICINE

## 2022-09-16 PROCEDURE — G8536 NO DOC ELDER MAL SCRN: HCPCS | Performed by: INTERNAL MEDICINE

## 2022-09-16 PROCEDURE — G8510 SCR DEP NEG, NO PLAN REQD: HCPCS | Performed by: INTERNAL MEDICINE

## 2022-09-16 PROCEDURE — G8752 SYS BP LESS 140: HCPCS | Performed by: INTERNAL MEDICINE

## 2022-09-16 PROCEDURE — G8754 DIAS BP LESS 90: HCPCS | Performed by: INTERNAL MEDICINE

## 2022-09-16 PROCEDURE — 99213 OFFICE O/P EST LOW 20 MIN: CPT | Performed by: INTERNAL MEDICINE

## 2022-09-16 PROCEDURE — G8417 CALC BMI ABV UP PARAM F/U: HCPCS | Performed by: INTERNAL MEDICINE

## 2022-09-16 PROCEDURE — G8427 DOCREV CUR MEDS BY ELIG CLIN: HCPCS | Performed by: INTERNAL MEDICINE

## 2022-09-16 NOTE — PROGRESS NOTES
Assessment/Plan:     1. Benign hypertension  -controlled with lisinopril 40mg daily.     - CBC WITH AUTOMATED DIFF; Future  - METABOLIC PANEL, COMPREHENSIVE; Future  - LIPID PANEL; Future    2. Encounter for long-term (current) use of medications      3. Colon cancer screening  -due rof screening.     - REFERRAL FOR COLONOSCOPY     4. hyperlipidemia   -taking atorvastatin 40mg daily. -need fasting lipid panel today. 5. History of prostate cancer  -following with [facility] and local urology. Follow-up Disposition:     Follow up in 6 months               Subjective:      Perla Campuzano is a 66 y.o. male who presents today for follow up of his hypertension and hyperlipidemia     Since last visit :  -last week, CT and bone scan. Appt in 2 weeks - Dr. Breanna Higginbotham. at [facility], urology. With labs. He is enrolled in a study at [facility] for his prostate cancer and treatment. EMPOWER Study- looking at weight loss and cancer survivorship. He is randomized to the standard of Care group. Not working with weight loss . -follow up with Dr. Kishore Lewis, urologist is Monday. Patient Care Team:  -Dr. Marcos Cadet. Breanna Higginbotham, urology, Suyapa Pham. Tadeo Davis, urology  -[facility]        Objective:      Wt Readings from Last 3 Encounters:   09/16/22 185 lb 12.8 oz (84.3 kg)   03/16/22 190 lb 6.4 oz (86.4 kg)   02/14/22 183 lb (83 kg)     BP Readings from Last 3 Encounters:   09/16/22 (!) 152/79   03/16/22 125/80   02/14/22 (!) 162/84     Visit Vitals  /75   Pulse (!) 59   Temp 97.8 °F (36.6 °C) (Temporal)   Resp 20   Ht 5' 11\" (1.803 m)   Wt 185 lb 12.8 oz (84.3 kg)   SpO2 98%   BMI 25.91 kg/m²     General appearance: alert, cooperative, no distress, appears stated age  Head: Normocephalic, without obvious abnormality, atraumatic  Neck: supple, symmetrical, trachea midline, no adenopathy, no carotid bruit, and no JVD  Lungs: clear to auscultation bilaterally  Heart: regular rate and rhythm, S1, S2 normal, no murmur, click, rub or gallop  Abdomen: soft, non-tender. Bowel sounds normal. No masses,  no organomegaly  Extremities: extremities normal, atraumatic, no cyanosis or edema              Disclaimer:  Return if symptoms worsen or fail to improve. Advised patient to call back or return to office if symptoms worsen/change/persist.     Discussed expected course/resolution/complications of diagnosis in detail with patient. Medication risks/benefits/costs/interactions/alternatives discussed with patient. Patient was given an after visit summary which includes diagnoses, current medications, & vitals. Patient expressed understanding with the diagnosis and plan.

## 2022-10-06 RX ORDER — LISINOPRIL 40 MG/1
TABLET ORAL
Qty: 90 TABLET | Refills: 1 | Status: SHIPPED | OUTPATIENT
Start: 2022-10-06

## 2022-11-01 ENCOUNTER — VIRTUAL VISIT (OUTPATIENT)
Dept: INTERNAL MEDICINE CLINIC | Age: 72
End: 2022-11-01
Payer: MEDICARE

## 2022-11-01 DIAGNOSIS — R05.1 ACUTE COUGH: Primary | ICD-10-CM

## 2022-11-01 PROCEDURE — 99213 OFFICE O/P EST LOW 20 MIN: CPT | Performed by: INTERNAL MEDICINE

## 2022-11-01 PROCEDURE — G8536 NO DOC ELDER MAL SCRN: HCPCS | Performed by: INTERNAL MEDICINE

## 2022-11-01 PROCEDURE — G8427 DOCREV CUR MEDS BY ELIG CLIN: HCPCS | Performed by: INTERNAL MEDICINE

## 2022-11-01 PROCEDURE — G8417 CALC BMI ABV UP PARAM F/U: HCPCS | Performed by: INTERNAL MEDICINE

## 2022-11-01 PROCEDURE — G0463 HOSPITAL OUTPT CLINIC VISIT: HCPCS | Performed by: INTERNAL MEDICINE

## 2022-11-01 PROCEDURE — 3017F COLORECTAL CA SCREEN DOC REV: CPT | Performed by: INTERNAL MEDICINE

## 2022-11-01 PROCEDURE — G8756 NO BP MEASURE DOC: HCPCS | Performed by: INTERNAL MEDICINE

## 2022-11-01 PROCEDURE — G8510 SCR DEP NEG, NO PLAN REQD: HCPCS | Performed by: INTERNAL MEDICINE

## 2022-11-01 PROCEDURE — 1101F PT FALLS ASSESS-DOCD LE1/YR: CPT | Performed by: INTERNAL MEDICINE

## 2022-11-01 RX ORDER — ATORVASTATIN CALCIUM 40 MG/1
40 TABLET, FILM COATED ORAL
Qty: 90 TABLET | Refills: 1 | Status: SHIPPED | OUTPATIENT
Start: 2022-11-01
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
## 2023-01-05 ENCOUNTER — TRANSCRIBE ORDER (OUTPATIENT)
Dept: SCHEDULING | Age: 73
End: 2023-01-05

## 2023-01-05 DIAGNOSIS — C61 PROSTATE CANCER (HCC): Primary | ICD-10-CM

## 2023-02-03 ENCOUNTER — HOSPITAL ENCOUNTER (OUTPATIENT)
Dept: PET IMAGING | Age: 73
End: 2023-02-03
Attending: UROLOGY
Payer: MEDICARE

## 2023-02-03 ENCOUNTER — HOSPITAL ENCOUNTER (OUTPATIENT)
Dept: PET IMAGING | Age: 73
Discharge: HOME | End: 2023-02-03
Attending: UROLOGY
Payer: MEDICARE

## 2023-02-03 DIAGNOSIS — C61 PROSTATE CANCER (HCC): ICD-10-CM

## 2023-02-03 PROCEDURE — 78815 PET IMAGE W/CT SKULL-THIGH: CPT

## 2023-03-01 ENCOUNTER — HOSPITAL ENCOUNTER (OUTPATIENT)
Dept: RADIATION THERAPY | Age: 73
Discharge: HOME | End: 2023-03-01

## 2023-03-20 ENCOUNTER — OFFICE VISIT (OUTPATIENT)
Dept: INTERNAL MEDICINE CLINIC | Age: 73
End: 2023-03-20
Payer: MEDICARE

## 2023-03-20 VITALS
RESPIRATION RATE: 16 BRPM | DIASTOLIC BLOOD PRESSURE: 82 MMHG | HEART RATE: 57 BPM | BODY MASS INDEX: 26.6 KG/M2 | OXYGEN SATURATION: 95 % | TEMPERATURE: 97.5 F | HEIGHT: 71 IN | WEIGHT: 190 LBS | SYSTOLIC BLOOD PRESSURE: 138 MMHG

## 2023-03-20 DIAGNOSIS — E78.2 MIXED HYPERLIPIDEMIA: ICD-10-CM

## 2023-03-20 DIAGNOSIS — Z13.31 SCREENING FOR DEPRESSION: ICD-10-CM

## 2023-03-20 DIAGNOSIS — R73.01 IFG (IMPAIRED FASTING GLUCOSE): ICD-10-CM

## 2023-03-20 DIAGNOSIS — Z79.899 ENCOUNTER FOR LONG-TERM (CURRENT) USE OF MEDICATIONS: ICD-10-CM

## 2023-03-20 DIAGNOSIS — I10 BENIGN HYPERTENSION: ICD-10-CM

## 2023-03-20 DIAGNOSIS — C61 PROSTATE CANCER (HCC): ICD-10-CM

## 2023-03-20 DIAGNOSIS — Z00.00 MEDICARE ANNUAL WELLNESS VISIT, SUBSEQUENT: Primary | ICD-10-CM

## 2023-03-20 PROCEDURE — G8536 NO DOC ELDER MAL SCRN: HCPCS | Performed by: INTERNAL MEDICINE

## 2023-03-20 PROCEDURE — G8510 SCR DEP NEG, NO PLAN REQD: HCPCS | Performed by: INTERNAL MEDICINE

## 2023-03-20 PROCEDURE — 1101F PT FALLS ASSESS-DOCD LE1/YR: CPT | Performed by: INTERNAL MEDICINE

## 2023-03-20 PROCEDURE — G0439 PPPS, SUBSEQ VISIT: HCPCS | Performed by: INTERNAL MEDICINE

## 2023-03-20 PROCEDURE — 3017F COLORECTAL CA SCREEN DOC REV: CPT | Performed by: INTERNAL MEDICINE

## 2023-03-20 PROCEDURE — G8417 CALC BMI ABV UP PARAM F/U: HCPCS | Performed by: INTERNAL MEDICINE

## 2023-03-20 PROCEDURE — G8427 DOCREV CUR MEDS BY ELIG CLIN: HCPCS | Performed by: INTERNAL MEDICINE

## 2023-03-20 PROCEDURE — 99213 OFFICE O/P EST LOW 20 MIN: CPT | Performed by: INTERNAL MEDICINE

## 2023-03-20 PROCEDURE — G0463 HOSPITAL OUTPT CLINIC VISIT: HCPCS | Performed by: INTERNAL MEDICINE

## 2023-03-20 RX ORDER — TAMSULOSIN HYDROCHLORIDE 0.4 MG/1
CAPSULE ORAL
COMMUNITY
Start: 2023-02-13 | End: 2023-03-20

## 2023-03-20 NOTE — PROGRESS NOTES
Verified name and birth date for privacy precautions. Chart reviewed in preparation for today's visit.      Chief Complaint   Patient presents with    Annual Wellness Visit          Health Maintenance Due   Topic    AAA Screening 73-67 YO Male Smoking Patients     Colorectal Cancer Screening Combo     DTaP/Tdap/Td series (2 - Td or Tdap)    Medicare Yearly Exam          Wt Readings from Last 3 Encounters:   03/20/23 190 lb (86.2 kg)   09/16/22 185 lb 12.8 oz (84.3 kg)   03/16/22 190 lb 6.4 oz (86.4 kg)     Temp Readings from Last 3 Encounters:   03/20/23 97.5 °F (36.4 °C) (Temporal)   09/16/22 97.8 °F (36.6 °C) (Temporal)   03/16/22 97.1 °F (36.2 °C) (Temporal)     BP Readings from Last 3 Encounters:   03/20/23 (!) 168/88   09/16/22 135/75   03/16/22 125/80     Pulse Readings from Last 3 Encounters:   03/20/23 (!) 57   09/16/22 (!) 59   03/16/22 74         Learning Assessment:  :     Learning Assessment 8/6/2018 9/18/2017 10/10/2014   PRIMARY LEARNER Patient Patient Patient   HIGHEST LEVEL OF EDUCATION - PRIMARY LEARNER  [address] PRIMARY LEARNER NONE - NONE   CO-LEARNER CAREGIVER - - No   PRIMARY LANGUAGE ENGLISH ENGLISH ENGLISH    NEED - - No   LEARNER PREFERENCE PRIMARY DEMONSTRATION LISTENING OTHER (COMMENT)   LEARNING SPECIAL TOPICS - - no   ANSWERED BY patient paitient  patient   RELATIONSHIP SELF SELF SELF       Depression Screening:  :     3 most recent PHQ Screens 3/20/2023   Little interest or pleasure in doing things Not at all   Feeling down, depressed, irritable, or hopeless Not at all   Total Score PHQ 2 0   Trouble falling or staying asleep, or sleeping too much -   Feeling tired or having little energy -   Poor appetite, weight loss, or overeating -   Feeling bad about yourself - or that you are a failure or have let yourself or your family down -   Trouble concentrating on things such as school, work, reading, or watching TV -   Moving or speaking so slowly that other people could have noticed; or the opposite being so fidgety that others notice -   Thoughts of being better off dead, or hurting yourself in some way -   PHQ 9 Score -   How difficult have these problems made it for you to do your work, take care of your home and get along with others -       Fall Risk Assessment:  :     Fall Risk Assessment, last 12 mths 3/20/2023   Able to walk? Yes   Fall in past 12 months? 0   Do you feel unsteady? 0   Are you worried about falling 0   Number of falls in past 12 months -   Fall with injury? -       Abuse Screening:  :     Abuse Screening Questionnaire 3/20/2023 3/20/2023 11/1/2022 2/14/2022 8/6/2018 1/2/2018 10/10/2014   Do you ever feel afraid of your partner? N N N N N N N   Are you in a relationship with someone who physically or mentally threatens you? N N N N N N N   Is it safe for you to go home?  Kevin Wang
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
## 2023-03-20 NOTE — PATIENT INSTRUCTIONS
Medicare Wellness Visit, Male    The best way to live healthy is to have a lifestyle where you eat a well-balanced diet, exercise regularly, limit alcohol use, and quit all forms of tobacco/nicotine, if applicable. Regular preventive services are another way to keep healthy. Preventive services (vaccines, screening tests, monitoring & exams) can help personalize your care plan, which helps you manage your own care. Screening tests can find health problems at the earliest stages, when they are easiest to treat. Angimario alberto follows the current, evidence-based guidelines published by the [facility]ie ([facility]) when recommending preventive services for our patients. Because we follow these guidelines, sometimes recommendations change over time as research supports it. (For example, a prostate screening blood test is no longer routinely recommended for men with no symptoms). Of course, you and your doctor may decide to screen more often for some diseases, based on your risk and co-morbidities (chronic disease you are already diagnosed with). Preventive services for you include:  - Medicare offers their members a free annual wellness visit, which is time for you and your primary care provider to discuss and plan for your preventive service needs.  Take advantage of this benefit every year!    -Over the age of 72 should receive the recommended pneumonia vaccines.   -All adults should have a flu vaccine yearly.  -All adults should receive a tetanus vaccine every 10 years.  -Over the age of 48 should receive the shingles vaccines.    -All adults should be screened once for Hepatitis C.  -All adults age 38-68 who are overweight should have a diabetes screening test once every three years.   -Other screening tests & preventive services for persons with diabetes include: an eye exam to screen for diabetic retinopathy, a kidney function test, a foot exam, and stricter control over your cholesterol.   -Cardiovascular screening for adults with routine risk involves an electrocardiogram (ECG) at intervals determined by the provider.     -Colorectal cancer screening should be done for adults age 43-69 with no increased risk factors for colorectal cancer. There are a number of acceptable methods of screening for this type of cancer. Each test has its own benefits and drawbacks. Discuss with your provider what is most appropriate for you during your annual wellness visit. The different tests include: colonoscopy (considered the best screening method), a fecal occult blood test, a fecal DNA test, and sigmoidoscopy.    -Lung cancer screening is recommended annually with a low dose CT scan for adults between age 54 and 68, who have smoked at least 30 pack years (equivalent of 1 pack per day for 30 days), and who is a current smoker or quit less than 15 years ago. -An Abdominal Aortic Aneurysm (AAA) Screening is recommended for men age 73-68 who has ever smoked in their lifetime.

## 2023-03-21 LAB
ALBUMIN SERPL-MCNC: 3.9 G/DL (ref 3.5–5)
ALBUMIN/GLOB SERPL: 1.4 (ref 1.1–2.2)
ALP SERPL-CCNC: 36 U/L (ref 45–117)
ALT SERPL-CCNC: 40 U/L (ref 12–78)
ANION GAP SERPL CALC-SCNC: 0 MMOL/L (ref 5–15)
APPEARANCE UR: CLEAR
AST SERPL-CCNC: 25 U/L (ref 15–37)
BACTERIA URNS QL MICRO: NEGATIVE /HPF
BASOPHILS # BLD: 0 K/UL (ref 0–0.1)
BASOPHILS NFR BLD: 0 % (ref 0–1)
BILIRUB SERPL-MCNC: 0.5 MG/DL (ref 0.2–1)
BILIRUB UR QL: NEGATIVE
BUN SERPL-MCNC: 19 MG/DL (ref 6–20)
BUN/CREAT SERPL: 25 (ref 12–20)
CALCIUM SERPL-MCNC: 9 MG/DL (ref 8.5–10.1)
CHLORIDE SERPL-SCNC: 108 MMOL/L (ref 97–108)
CHOLEST SERPL-MCNC: 106 MG/DL
CO2 SERPL-SCNC: 32 MMOL/L (ref 21–32)
COLOR UR: NORMAL
CREAT SERPL-MCNC: 0.77 MG/DL (ref 0.7–1.3)
DIFFERENTIAL METHOD BLD: NORMAL
EOSINOPHIL # BLD: 0.1 K/UL (ref 0–0.4)
EOSINOPHIL NFR BLD: 1 % (ref 0–7)
EPITH CASTS URNS QL MICRO: NORMAL /LPF
ERYTHROCYTE [DISTWIDTH] IN BLOOD BY AUTOMATED COUNT: 12.2 % (ref 11.5–14.5)
EST. AVERAGE GLUCOSE BLD GHB EST-MCNC: 117 MG/DL
GLOBULIN SER CALC-MCNC: 2.7 G/DL (ref 2–4)
GLUCOSE SERPL-MCNC: 90 MG/DL (ref 65–100)
GLUCOSE UR STRIP.AUTO-MCNC: NEGATIVE MG/DL
HBA1C MFR BLD: 5.7 % (ref 4–5.6)
HCT VFR BLD AUTO: 43.8 % (ref 36.6–50.3)
HDLC SERPL-MCNC: 40 MG/DL
HDLC SERPL: 2.7 (ref 0–5)
HGB BLD-MCNC: 14.3 G/DL (ref 12.1–17)
HGB UR QL STRIP: NEGATIVE
HYALINE CASTS URNS QL MICRO: NORMAL /LPF (ref 0–5)
IMM GRANULOCYTES # BLD AUTO: 0 K/UL (ref 0–0.04)
IMM GRANULOCYTES NFR BLD AUTO: 0 % (ref 0–0.5)
KETONES UR QL STRIP.AUTO: NEGATIVE MG/DL
LDLC SERPL CALC-MCNC: 54.4 MG/DL (ref 0–100)
LEUKOCYTE ESTERASE UR QL STRIP.AUTO: NEGATIVE
LYMPHOCYTES # BLD: 1.4 K/UL (ref 0.8–3.5)
LYMPHOCYTES NFR BLD: 22 % (ref 12–49)
MCH RBC QN AUTO: 31.8 PG (ref 26–34)
MCHC RBC AUTO-ENTMCNC: 32.6 G/DL (ref 30–36.5)
MCV RBC AUTO: 97.6 FL (ref 80–99)
MONOCYTES # BLD: 0.7 K/UL (ref 0–1)
MONOCYTES NFR BLD: 11 % (ref 5–13)
NEUTS SEG # BLD: 4.2 K/UL (ref 1.8–8)
NEUTS SEG NFR BLD: 66 % (ref 32–75)
NITRITE UR QL STRIP.AUTO: NEGATIVE
NRBC # BLD: 0 K/UL (ref 0–0.01)
NRBC BLD-RTO: 0 PER 100 WBC
PH UR STRIP: 7 (ref 5–8)
PLATELET # BLD AUTO: 155 K/UL (ref 150–400)
PMV BLD AUTO: 11.1 FL (ref 8.9–12.9)
POTASSIUM SERPL-SCNC: 4.9 MMOL/L (ref 3.5–5.1)
PROT SERPL-MCNC: 6.6 G/DL (ref 6.4–8.2)
PROT UR STRIP-MCNC: NEGATIVE MG/DL
RBC # BLD AUTO: 4.49 M/UL (ref 4.1–5.7)
RBC #/AREA URNS HPF: NORMAL /HPF (ref 0–5)
SODIUM SERPL-SCNC: 140 MMOL/L (ref 136–145)
SP GR UR REFRACTOMETRY: 1.01 (ref 1–1.03)
TRIGL SERPL-MCNC: 58 MG/DL (ref ?–150)
UA: UC IF INDICATED,UAUC: NORMAL
UROBILINOGEN UR QL STRIP.AUTO: 0.2 EU/DL (ref 0.2–1)
VLDLC SERPL CALC-MCNC: 11.6 MG/DL
WBC # BLD AUTO: 6.4 K/UL (ref 4.1–11.1)
WBC URNS QL MICRO: NORMAL /HPF (ref 0–4)

## 2023-04-21 ENCOUNTER — HOSPITAL ENCOUNTER (OUTPATIENT)
Dept: RADIATION THERAPY | Age: 73
Discharge: HOME | End: 2023-04-21

## 2023-04-24 ENCOUNTER — HOSPITAL ENCOUNTER (OUTPATIENT)
Dept: RADIATION THERAPY | Age: 73
Discharge: HOME | End: 2023-04-24

## 2023-04-25 RX ORDER — LISINOPRIL 40 MG/1
40 TABLET ORAL DAILY
Qty: 90 TABLET | Refills: 1 | Status: SHIPPED | OUTPATIENT
Start: 2023-04-25

## 2023-04-26 ENCOUNTER — HOSPITAL ENCOUNTER (OUTPATIENT)
Dept: RADIATION THERAPY | Age: 73
Discharge: HOME | End: 2023-04-26

## 2023-04-26 ENCOUNTER — OFFICE VISIT (OUTPATIENT)
Dept: ORTHOPEDIC SURGERY | Age: 73
End: 2023-04-26

## 2023-04-26 VITALS — BODY MASS INDEX: 26.6 KG/M2 | WEIGHT: 190 LBS | HEIGHT: 71 IN

## 2023-04-26 DIAGNOSIS — M79.672 LEFT FOOT PAIN: ICD-10-CM

## 2023-04-26 DIAGNOSIS — M72.2 PLANTAR FASCIITIS OF LEFT FOOT: Primary | ICD-10-CM

## 2023-04-28 ENCOUNTER — HOSPITAL ENCOUNTER (OUTPATIENT)
Dept: RADIATION THERAPY | Age: 73
Discharge: HOME | End: 2023-04-28

## 2023-05-02 ENCOUNTER — HOSPITAL ENCOUNTER (OUTPATIENT)
Dept: RADIATION THERAPY | Age: 73
Discharge: HOME | End: 2023-05-02

## 2023-05-03 RX ORDER — METHYLPREDNISOLONE 4 MG/1
TABLET ORAL
Qty: 1 DOSE PACK | Refills: 0 | Status: SHIPPED | OUTPATIENT
Start: 2023-05-03

## 2023-05-09 ENCOUNTER — HOSPITAL ENCOUNTER (OUTPATIENT)
Facility: HOSPITAL | Age: 73
Setting detail: OUTPATIENT SURGERY
Discharge: HOME | End: 2023-05-09
Attending: INTERNAL MEDICINE | Admitting: INTERNAL MEDICINE
Payer: MEDICARE

## 2023-05-09 ENCOUNTER — ANESTHESIA EVENT (OUTPATIENT)
Facility: HOSPITAL | Age: 73
End: 2023-05-09
Payer: MEDICARE

## 2023-05-09 ENCOUNTER — ANESTHESIA (OUTPATIENT)
Facility: HOSPITAL | Age: 73
End: 2023-05-09
Payer: MEDICARE

## 2023-05-09 VITALS
DIASTOLIC BLOOD PRESSURE: 80 MMHG | RESPIRATION RATE: 18 BRPM | OXYGEN SATURATION: 96 % | BODY MASS INDEX: 23.94 KG/M2 | HEIGHT: 71 IN | TEMPERATURE: 98.6 F | SYSTOLIC BLOOD PRESSURE: 153 MMHG | WEIGHT: 171 LBS | HEART RATE: 68 BPM

## 2023-05-09 PROCEDURE — 3700000001 HC ADD 15 MINUTES (ANESTHESIA): Performed by: INTERNAL MEDICINE

## 2023-05-09 PROCEDURE — 2500000003 HC RX 250 WO HCPCS: Performed by: NURSE ANESTHETIST, CERTIFIED REGISTERED

## 2023-05-09 PROCEDURE — 6370000000 HC RX 637 (ALT 250 FOR IP): Performed by: INTERNAL MEDICINE

## 2023-05-09 PROCEDURE — 6360000002 HC RX W HCPCS: Performed by: NURSE ANESTHETIST, CERTIFIED REGISTERED

## 2023-05-09 PROCEDURE — 3600007501: Performed by: INTERNAL MEDICINE

## 2023-05-09 PROCEDURE — 2580000003 HC RX 258: Performed by: INTERNAL MEDICINE

## 2023-05-09 PROCEDURE — 7100000010 HC PHASE II RECOVERY - FIRST 15 MIN: Performed by: INTERNAL MEDICINE

## 2023-05-09 PROCEDURE — 3700000000 HC ANESTHESIA ATTENDED CARE: Performed by: INTERNAL MEDICINE

## 2023-05-09 PROCEDURE — 3600007511: Performed by: INTERNAL MEDICINE

## 2023-05-09 RX ORDER — METHYLPREDNISOLONE 4 MG/1
TABLET ORAL
COMMUNITY
Start: 2023-05-03

## 2023-05-09 RX ORDER — SIMETHICONE 20 MG/.3ML
EMULSION ORAL PRN
Status: DISCONTINUED | OUTPATIENT
Start: 2023-05-09 | End: 2023-05-09 | Stop reason: ALTCHOICE

## 2023-05-09 RX ORDER — ATORVASTATIN CALCIUM 40 MG/1
40 TABLET, FILM COATED ORAL NIGHTLY
COMMUNITY
Start: 2023-03-07

## 2023-05-09 RX ORDER — SODIUM CHLORIDE 9 MG/ML
25 INJECTION, SOLUTION INTRAVENOUS PRN
Status: DISCONTINUED | OUTPATIENT
Start: 2023-05-09 | End: 2023-05-09 | Stop reason: HOSPADM

## 2023-05-09 RX ORDER — ASPIRIN 81 MG/1
TABLET ORAL DAILY
COMMUNITY

## 2023-05-09 RX ORDER — SODIUM CHLORIDE 0.9 % (FLUSH) 0.9 %
5-40 SYRINGE (ML) INJECTION PRN
Status: DISCONTINUED | OUTPATIENT
Start: 2023-05-09 | End: 2023-05-09 | Stop reason: HOSPADM

## 2023-05-09 RX ORDER — SODIUM CHLORIDE 9 MG/ML
INJECTION, SOLUTION INTRAVENOUS CONTINUOUS
Status: DISCONTINUED | OUTPATIENT
Start: 2023-05-09 | End: 2023-05-09 | Stop reason: HOSPADM

## 2023-05-09 RX ORDER — SODIUM CHLORIDE 0.9 % (FLUSH) 0.9 %
5-40 SYRINGE (ML) INJECTION EVERY 12 HOURS SCHEDULED
Status: DISCONTINUED | OUTPATIENT
Start: 2023-05-09 | End: 2023-05-09 | Stop reason: HOSPADM

## 2023-05-09 RX ORDER — LISINOPRIL 40 MG/1
40 TABLET ORAL DAILY
COMMUNITY
Start: 2023-04-20

## 2023-05-09 RX ADMIN — PROPOFOL 30 MG: 10 INJECTION, EMULSION INTRAVENOUS at 10:27

## 2023-05-09 RX ADMIN — PROPOFOL 30 MG: 10 INJECTION, EMULSION INTRAVENOUS at 10:31

## 2023-05-09 RX ADMIN — PROPOFOL 50 MG: 10 INJECTION, EMULSION INTRAVENOUS at 10:17

## 2023-05-09 RX ADMIN — PROPOFOL 100 MG: 10 INJECTION, EMULSION INTRAVENOUS at 10:15

## 2023-05-09 RX ADMIN — PROPOFOL 50 MG: 10 INJECTION, EMULSION INTRAVENOUS at 10:20

## 2023-05-09 RX ADMIN — SODIUM CHLORIDE: 9 INJECTION, SOLUTION INTRAVENOUS at 09:40

## 2023-05-09 RX ADMIN — LIDOCAINE HYDROCHLORIDE 50 MG: 20 INJECTION, SOLUTION EPIDURAL; INFILTRATION; INTRACAUDAL; PERINEURAL at 10:15

## 2023-05-09 RX ADMIN — PROPOFOL 40 MG: 10 INJECTION, EMULSION INTRAVENOUS at 10:23

## 2023-05-09 RX ADMIN — SODIUM CHLORIDE: 9 INJECTION, SOLUTION INTRAVENOUS at 10:34

## 2023-05-09 ASSESSMENT — PAIN - FUNCTIONAL ASSESSMENT
PAIN_FUNCTIONAL_ASSESSMENT: 0-10
PAIN_FUNCTIONAL_ASSESSMENT: NONE - DENIES PAIN

## 2023-05-09 NOTE — OP NOTE
Sal Galvez Replaced by [facility] [address] Payton Matthews M.D.  [facility], [address] [place] [address]  [phone]               Colonoscopy Procedure Note    NAME: Devi Mack  :  1950  MRN:  [id number]    Indications:   Screening colonoscopy     : Adalid Merida MD    Referring Provider:  Gurinder Canales MD    Staff: Circulator: Iliana Lebron RN  Endoscopy Technician: Oliverio Mathews    Prosthetic devices, grafts, tissues, transplant, or devices implanted: none    Medicines:  MAC anesthesia      Procedure Details:  After informed consent was obtained with all risks and benefits of the procedure explained and preprocedure exam completed, the patient was placed in the left lateral decubitus position. Universal protocol for patient identification was performed and documented in the nursing notes. Throughout the procedure, the patient's blood pressure was monitored at least every five minutes; pulse, and oxygen saturations were monitored continuously. All vital signs were documented in the nursing notes. A digital rectal exam was performed and was normal.  The Olympus videocolonoscope  was inserted in the rectum and carefully advanced to the cecum, which was identified by the ileocecal valve and appendiceal orifice. The colonoscope was slowly withdrawn with careful evaluation between folds. Retroflexion in the rectum was performed; findings and interventions are described below. Procedure start time, extent reached time/cecum time, and procedure end time are documented in the nursing notes. The quality of preparation was adequate. Findings: Moderate pan-diverticulosis  Small internal hemorrhoids  Mild radiation proctopathy    Interventions:    none    Specimens:   * No specimens in log *    EBL:  None. Complications:   No immediate complications     Impression:  -See post-procedure diagnoses.      Recommendations:   -For colon cancer screening in this average-risk patient, colonoscopy may

## 2023-05-09 NOTE — PROGRESS NOTES
Bedside report received from [facility] for post procedure care. Dr. Valarie Snyder at bedside for post procedure consult.

## 2023-05-09 NOTE — DISCHARGE INSTRUCTIONS
disinfectant wipes or sprays. Take special care to clean things that you grab with your hands. These include doorknobs, remote controls, phones, and handles on your refrigerator and microwave. And don't forget countertops, tabletops, bathrooms, and computer keyboards. When to call for help  Call 911 anytime you think you may need emergency care. For example, call if:  You have severe trouble breathing. (You can't talk at all.)  You have constant chest pain or pressure. You are severely dizzy or lightheaded. You are confused or can't think clearly. Your face and lips have a blue color. You pass out (lose consciousness) or are very hard to wake up. Call your doctor now if you develop symptoms such as:  Shortness of breath. Fever. Cough. If you need to get care, call ahead to the doctor's office for instructions before you go. Make sure you wear a face mask, if you have one, to prevent exposing other people to the virus. Where can you get the latest information? The following health organizations are tracking and studying this virus. Their websites contain the most up-to-date information. Radha Backbone also learn what to do if you think you may have been exposed to the virus. U.S. Centers for Disease Control and Prevention (CDC): The CDC provides updated news about the disease and travel advice. The website also tells you how to prevent the spread of infection. www.cdc.gov  World Health Organization [facility]): WHO offers information about the virus outbreaks. WHO also has travel advice. www.who.int  Current as of: April 1, 2020               Content Version: 12.4  © 3287-2101 Healthwise, Incorporated. Care instructions adapted under license by your healthcare professional. If you have questions about a medical condition or this instruction, always ask your healthcare professional. [address] any warranty or liability for your use of this information.

## 2023-05-09 NOTE — ADDENDUM NOTE
Addendum  created 05/09/23 1113 by MANSI House CRNA    Attestation recorded in [place], [address], [place] Attestations filed

## 2023-05-09 NOTE — ANESTHESIA POSTPROCEDURE EVALUATION
Post-Anesthesia Evaluation and Assessment    Patient: Devi Mack MRN: [id number]  SSN: [id number]    YOB: 1950  Age: 67 y.o. Sex: male      I have evaluated the patient and they are stable and ready for discharge from the PACU. Cardiovascular Function/Vital Signs  Visit Vitals  BP (!) 153/80   Pulse 68   Temp 98.6 °F (37 °C) (Temporal)   Resp 18   Ht 5' 11\" (1.803 m)   Wt 171 lb (77.6 kg)   SpO2 96%   BMI 23.85 kg/m²       Patient is status post Monitor Anesthesia Care anesthesia for Procedure(s):  COLONOSCOPY. Nausea/Vomiting: None    Postoperative hydration reviewed and adequate. Pain:      Managed    Neurological Status: At baseline    Mental Status, Level of Consciousness: Alert and  oriented to person, place, and time    Pulmonary Status:       Adequate oxygenation and airway patent    Complications related to anesthesia: None    Post-anesthesia assessment completed. No concerns    Signed By: Deepika Rojas MD     May 9, 2023            Department of Anesthesiology  Postprocedure Note    Patient: Devi Mack  MRN: [id number]  YOB: 1950  Date of evaluation: 5/9/2023      Procedure Summary     Date: 05/09/23 Room / Location: [facility] 03 / [facility] ENDOSCOPY    Anesthesia Start: 1010 Anesthesia Stop: 5882    Procedure: COLONOSCOPY Diagnosis:       Special screening for malignant neoplasms, colon      (Special screening for malignant neoplasms, colon [Z12.11])    Providers:  Deshaun Nicole MD Responsible Provider: Juan Jose Beverly MD    Anesthesia Type: MAC ASA Status: 2          Anesthesia Type: MAC    Renaldo Phase I:      Renaldo Phase II: Renaldo Score: 10      Anesthesia Post Evaluation
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
## 2023-05-09 NOTE — PROGRESS NOTES
Pt cleared and appropriate for discharge. Pt is awake and alert and VS are at baseline. Responsible  contacted for discharge. All discharge instructions , RX , and all questions have been reviewed with understanding. Paper instructions provided.

## 2023-05-09 NOTE — PERIOP NOTE
.Patient has been evaluated by anesthesia pre-procedure. Patient alert and oriented. Vital signs will not be charted by the Endoscopy nurse. All vitals, airway, and loc are monitored by anesthesia staff throughout procedure. .Endoscope will be pre-cleaned at bedside immediately following procedure by CLAY    Pt has an adequate prep.   Dx: diverticulosis, rad proc, hemorrhoids

## 2023-05-29 RX ORDER — CETIRIZINE HYDROCHLORIDE 10 MG/1
TABLET ORAL DAILY PRN
COMMUNITY

## 2023-05-29 RX ORDER — FLUTICASONE PROPIONATE 50 MCG
2 SPRAY, SUSPENSION (ML) NASAL DAILY
COMMUNITY
Start: 2017-05-22

## 2023-08-24 ENCOUNTER — E-VISIT (OUTPATIENT)
Age: 73
End: 2023-08-24
Payer: MEDICARE

## 2023-08-24 DIAGNOSIS — U07.1 COVID: Primary | ICD-10-CM

## 2023-08-24 PROCEDURE — 99421 OL DIG E/M SVC 5-10 MIN: CPT | Performed by: INTERNAL MEDICINE

## 2023-08-24 ASSESSMENT — LIFESTYLE VARIABLES
PACKS_PER_DAY: .5
SMOKING_YEARS: 5
SMOKING_STATUS: NO, BUT I USED TO SMOKE

## 2023-08-24 NOTE — PROGRESS NOTES
Latesha Cruise (1950) initiated an asynchronous digital communication through [employer]. HPI: per patient questionnaire   -3 days symptoms. Cough with production and fever. Wife tested positive. He tested positive 8/24/2023. Exam: not applicable    Diagnoses and all orders for this visit:  Diagnoses and all orders for this visit:    Lewis Gupta  -recommended use of paxlovid.  -risks include age and prostate cancer    Other orders  -     nirmatrelvir/ritonavir 300/100 (PAXLOVID) 20 x 150 MG & 10 x 100MG TBPK; Take 3 tablets (two 150 mg nirmatrelvir and one 100 mg ritonavir tablets) by mouth every 12 hours for 5 days. Time: EV1 - 5-10 minutes were spent on the digital evaluation and management of this patient.     Darshana Soni MD

## 2023-10-07 SDOH — ECONOMIC STABILITY: FOOD INSECURITY: WITHIN THE PAST 12 MONTHS, YOU WORRIED THAT YOUR FOOD WOULD RUN OUT BEFORE YOU GOT MONEY TO BUY MORE.: NEVER TRUE

## 2023-10-07 SDOH — ECONOMIC STABILITY: TRANSPORTATION INSECURITY
IN THE PAST 12 MONTHS, HAS LACK OF TRANSPORTATION KEPT YOU FROM MEETINGS, WORK, OR FROM GETTING THINGS NEEDED FOR DAILY LIVING?: NO

## 2023-10-07 SDOH — ECONOMIC STABILITY: HOUSING INSECURITY
IN THE LAST 12 MONTHS, WAS THERE A TIME WHEN YOU DID NOT HAVE A STEADY PLACE TO SLEEP OR SLEPT IN A SHELTER (INCLUDING NOW)?: NO

## 2023-10-07 SDOH — ECONOMIC STABILITY: INCOME INSECURITY: HOW HARD IS IT FOR YOU TO PAY FOR THE VERY BASICS LIKE FOOD, HOUSING, MEDICAL CARE, AND HEATING?: NOT HARD AT ALL

## 2023-10-07 SDOH — ECONOMIC STABILITY: FOOD INSECURITY: WITHIN THE PAST 12 MONTHS, THE FOOD YOU BOUGHT JUST DIDN'T LAST AND YOU DIDN'T HAVE MONEY TO GET MORE.: NEVER TRUE

## 2023-10-09 ENCOUNTER — OFFICE VISIT (OUTPATIENT)
Age: 73
End: 2023-10-09
Payer: MEDICARE

## 2023-10-09 VITALS
WEIGHT: 189.8 LBS | SYSTOLIC BLOOD PRESSURE: 142 MMHG | DIASTOLIC BLOOD PRESSURE: 74 MMHG | HEART RATE: 70 BPM | OXYGEN SATURATION: 96 % | RESPIRATION RATE: 18 BRPM | HEIGHT: 71 IN | BODY MASS INDEX: 26.57 KG/M2 | TEMPERATURE: 97.5 F

## 2023-10-09 DIAGNOSIS — C61 MALIGNANT NEOPLASM OF PROSTATE (HCC): ICD-10-CM

## 2023-10-09 DIAGNOSIS — E78.2 MIXED HYPERLIPIDEMIA: ICD-10-CM

## 2023-10-09 DIAGNOSIS — I10 ESSENTIAL (PRIMARY) HYPERTENSION: Primary | ICD-10-CM

## 2023-10-09 DIAGNOSIS — Z79.899 ENCOUNTER FOR LONG-TERM (CURRENT) USE OF MEDICATIONS: ICD-10-CM

## 2023-10-09 DIAGNOSIS — R73.01 IMPAIRED FASTING GLUCOSE: ICD-10-CM

## 2023-10-09 PROCEDURE — 3074F SYST BP LT 130 MM HG: CPT | Performed by: INTERNAL MEDICINE

## 2023-10-09 PROCEDURE — G8427 DOCREV CUR MEDS BY ELIG CLIN: HCPCS | Performed by: INTERNAL MEDICINE

## 2023-10-09 PROCEDURE — 99214 OFFICE O/P EST MOD 30 MIN: CPT | Performed by: INTERNAL MEDICINE

## 2023-10-09 PROCEDURE — G8484 FLU IMMUNIZE NO ADMIN: HCPCS | Performed by: INTERNAL MEDICINE

## 2023-10-09 PROCEDURE — 3017F COLORECTAL CA SCREEN DOC REV: CPT | Performed by: INTERNAL MEDICINE

## 2023-10-09 PROCEDURE — G8419 CALC BMI OUT NRM PARAM NOF/U: HCPCS | Performed by: INTERNAL MEDICINE

## 2023-10-09 PROCEDURE — 3078F DIAST BP <80 MM HG: CPT | Performed by: INTERNAL MEDICINE

## 2023-10-09 PROCEDURE — 1036F TOBACCO NON-USER: CPT | Performed by: INTERNAL MEDICINE

## 2023-10-09 PROCEDURE — 1123F ACP DISCUSS/DSCN MKR DOCD: CPT | Performed by: INTERNAL MEDICINE

## 2023-10-09 NOTE — PROGRESS NOTES
Assessment/Plan:     1. Essential (primary) hypertension  -controlled with use of lisinopril 40mg daily  -no adjustments needed    2. Mixed hyperlipidemia  -last fasting lipid panel done 3/20/2023 showed control.  -need fasting lipid panel at next visit  -continue atorvastatin 40mg daily. - Comprehensive Metabolic Panel; Future  - Lipid Panel; Future    3. Impaired fasting glucose  -continue to maintain low sugar and carbohydrate diet. - Comprehensive Metabolic Panel; Future  - Hemoglobin A1C; Future    4. Malignant neoplasm of prostate [facility])  -had psa done with [facility] which noted a slightly higher reading than his last psa done with his urologist.  -has follow up appointment with urology in November, 2023. Will likely have lab repeated at that time. 5. Encounter for long-term (current) use of medications    Orders Placed This Encounter    Comprehensive Metabolic Panel     Standing Status:   Future     Number of Occurrences:   1     Standing Expiration Date:   10/9/2024    Hemoglobin A1C     Standing Status:   Future     Number of Occurrences:   1     Standing Expiration Date:   10/9/2024    Lipid Panel     Standing Status:   Future     Number of Occurrences:   1     Standing Expiration Date:   10/9/2024     Order Specific Question:   Is Patient Fasting?/# of Hours     Answer:   8     Order Specific Question:   Has the patient fasted? Answer: Yes                Follow-up Disposition:     Return in about 6 months (around 4/9/2024) for Medicare AW. Subjective:      Nayeli Ascencio is a 75 y.o. male who presents today for follow up of his hypertension, hyperlipidemia, IFG and prostate cancer. Since last visit :  -had Radiation, targeted, 3 treatments. Didn't work. PSA still elevated at 1.34. August.  Just had psa done 10/3/2023- psa up to 1.56 with [facility] for asbestos exposure claims.  Dr. Denny Armas - may need to do salvage radiation followed by

## 2023-10-10 DIAGNOSIS — E78.2 MIXED HYPERLIPIDEMIA: ICD-10-CM

## 2023-10-10 DIAGNOSIS — R73.01 IMPAIRED FASTING GLUCOSE: ICD-10-CM

## 2023-10-10 LAB
ALBUMIN SERPL-MCNC: 3.9 G/DL (ref 3.5–5)
ALBUMIN/GLOB SERPL: 1.3 (ref 1.1–2.2)
ALP SERPL-CCNC: 36 U/L (ref 45–117)
ALT SERPL-CCNC: 41 U/L (ref 12–78)
ANION GAP SERPL CALC-SCNC: 4 MMOL/L (ref 5–15)
AST SERPL-CCNC: 21 U/L (ref 15–37)
BILIRUB SERPL-MCNC: 0.6 MG/DL (ref 0.2–1)
BUN SERPL-MCNC: 16 MG/DL (ref 6–20)
BUN/CREAT SERPL: 18 (ref 12–20)
CALCIUM SERPL-MCNC: 9.2 MG/DL (ref 8.5–10.1)
CHLORIDE SERPL-SCNC: 105 MMOL/L (ref 97–108)
CHOLEST SERPL-MCNC: 111 MG/DL
CO2 SERPL-SCNC: 30 MMOL/L (ref 21–32)
CREAT SERPL-MCNC: 0.89 MG/DL (ref 0.7–1.3)
EST. AVERAGE GLUCOSE BLD GHB EST-MCNC: 117 MG/DL
GLOBULIN SER CALC-MCNC: 3.1 G/DL (ref 2–4)
GLUCOSE SERPL-MCNC: 93 MG/DL (ref 65–100)
HBA1C MFR BLD: 5.7 % (ref 4–5.6)
HDLC SERPL-MCNC: 34 MG/DL
HDLC SERPL: 3.3 (ref 0–5)
LDLC SERPL CALC-MCNC: 53.2 MG/DL (ref 0–100)
POTASSIUM SERPL-SCNC: 4.7 MMOL/L (ref 3.5–5.1)
PROT SERPL-MCNC: 7 G/DL (ref 6.4–8.2)
SODIUM SERPL-SCNC: 139 MMOL/L (ref 136–145)
TRIGL SERPL-MCNC: 119 MG/DL
VLDLC SERPL CALC-MCNC: 23.8 MG/DL

## 2023-10-13 ENCOUNTER — PATIENT MESSAGE (OUTPATIENT)
Age: 73
End: 2023-10-13

## 2023-10-13 ENCOUNTER — TELEPHONE (OUTPATIENT)
Age: 73
End: 2023-10-13

## 2023-10-13 NOTE — TELEPHONE ENCOUNTER
From: Moustapha Valdez  To: Dr. Bharati Arvizu  Sent: 10/13/2023 3:34 PM EDT  Subject: Bloody Stool    I just experienced a very bloody stool. I had some abdominal pain yesterday. Today no pain. What should I do at this point?     Lyly Black  [phone]

## 2023-10-13 NOTE — TELEPHONE ENCOUNTER
Call sent back to me to triage. Pt states he just had a \"very dark and bloody stool. \" Prior to this was normal.   Spoke with Dr Chris Valdez about pt and she recommends pt call his GI doctor. Advised pt of this. He states he has seen Dr Verna Hannah. Given pt office number to call [phone].

## 2023-10-24 ENCOUNTER — HOSPITAL ENCOUNTER (OUTPATIENT)
Facility: HOSPITAL | Age: 73
Setting detail: OUTPATIENT SURGERY
Discharge: HOME | End: 2023-10-24
Attending: INTERNAL MEDICINE | Admitting: INTERNAL MEDICINE
Payer: MEDICARE

## 2023-10-24 ENCOUNTER — ANESTHESIA (OUTPATIENT)
Facility: HOSPITAL | Age: 73
End: 2023-10-24
Payer: MEDICARE

## 2023-10-24 ENCOUNTER — ANESTHESIA EVENT (OUTPATIENT)
Facility: HOSPITAL | Age: 73
End: 2023-10-24
Payer: MEDICARE

## 2023-10-24 VITALS
BODY MASS INDEX: 25.9 KG/M2 | OXYGEN SATURATION: 97 % | RESPIRATION RATE: 17 BRPM | WEIGHT: 185 LBS | HEIGHT: 71 IN | HEART RATE: 72 BPM | SYSTOLIC BLOOD PRESSURE: 135 MMHG | TEMPERATURE: 98.1 F | DIASTOLIC BLOOD PRESSURE: 72 MMHG

## 2023-10-24 PROCEDURE — 2500000003 HC RX 250 WO HCPCS: Performed by: NURSE ANESTHETIST, CERTIFIED REGISTERED

## 2023-10-24 PROCEDURE — 2709999900 HC NON-CHARGEABLE SUPPLY: Performed by: INTERNAL MEDICINE

## 2023-10-24 PROCEDURE — 6360000002 HC RX W HCPCS: Performed by: NURSE ANESTHETIST, CERTIFIED REGISTERED

## 2023-10-24 PROCEDURE — 3700000000 HC ANESTHESIA ATTENDED CARE: Performed by: INTERNAL MEDICINE

## 2023-10-24 PROCEDURE — 3600007502: Performed by: INTERNAL MEDICINE

## 2023-10-24 PROCEDURE — 3600007512: Performed by: INTERNAL MEDICINE

## 2023-10-24 PROCEDURE — 7100000011 HC PHASE II RECOVERY - ADDTL 15 MIN: Performed by: INTERNAL MEDICINE

## 2023-10-24 PROCEDURE — 3700000001 HC ADD 15 MINUTES (ANESTHESIA): Performed by: INTERNAL MEDICINE

## 2023-10-24 PROCEDURE — 2580000003 HC RX 258: Performed by: INTERNAL MEDICINE

## 2023-10-24 PROCEDURE — 7100000010 HC PHASE II RECOVERY - FIRST 15 MIN: Performed by: INTERNAL MEDICINE

## 2023-10-24 RX ORDER — SODIUM CHLORIDE 0.9 % (FLUSH) 0.9 %
5-40 SYRINGE (ML) INJECTION EVERY 12 HOURS SCHEDULED
Status: DISCONTINUED | OUTPATIENT
Start: 2023-10-24 | End: 2023-10-24 | Stop reason: HOSPADM

## 2023-10-24 RX ORDER — SODIUM CHLORIDE 0.9 % (FLUSH) 0.9 %
5-40 SYRINGE (ML) INJECTION PRN
Status: DISCONTINUED | OUTPATIENT
Start: 2023-10-24 | End: 2023-10-24 | Stop reason: HOSPADM

## 2023-10-24 RX ORDER — SODIUM CHLORIDE 9 MG/ML
25 INJECTION, SOLUTION INTRAVENOUS PRN
Status: DISCONTINUED | OUTPATIENT
Start: 2023-10-24 | End: 2023-10-24 | Stop reason: HOSPADM

## 2023-10-24 RX ORDER — SODIUM CHLORIDE 9 MG/ML
INJECTION, SOLUTION INTRAVENOUS CONTINUOUS
Status: DISCONTINUED | OUTPATIENT
Start: 2023-10-24 | End: 2023-10-24 | Stop reason: HOSPADM

## 2023-10-24 RX ORDER — LIDOCAINE HYDROCHLORIDE 20 MG/ML
INJECTION, SOLUTION EPIDURAL; INFILTRATION; INTRACAUDAL; PERINEURAL PRN
Status: DISCONTINUED | OUTPATIENT
Start: 2023-10-24 | End: 2023-10-24 | Stop reason: SDUPTHER

## 2023-10-24 RX ADMIN — PROPOFOL 50 MG: 10 INJECTION, EMULSION INTRAVENOUS at 13:09

## 2023-10-24 RX ADMIN — PROPOFOL 50 MG: 10 INJECTION, EMULSION INTRAVENOUS at 13:07

## 2023-10-24 RX ADMIN — SODIUM CHLORIDE: 9 INJECTION, SOLUTION INTRAVENOUS at 12:44

## 2023-10-24 RX ADMIN — PROPOFOL 75 MG: 10 INJECTION, EMULSION INTRAVENOUS at 13:05

## 2023-10-24 RX ADMIN — PROPOFOL 50 MG: 10 INJECTION, EMULSION INTRAVENOUS at 13:12

## 2023-10-24 RX ADMIN — LIDOCAINE HYDROCHLORIDE 50 MG: 20 INJECTION, SOLUTION EPIDURAL; INFILTRATION; INTRACAUDAL; PERINEURAL at 13:05

## 2023-10-24 RX ADMIN — PROPOFOL 50 MG: 10 INJECTION, EMULSION INTRAVENOUS at 13:14

## 2023-10-24 NOTE — PROGRESS NOTES
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
## 2023-10-24 NOTE — ANESTHESIA PRE PROCEDURE
Cardiovascular:  Exercise tolerance: good (>4 METS),   (+) hypertension:,         Rhythm: regular  Rate: normal                    Neuro/Psych:   (+) TIA,             GI/Hepatic/Renal:   (+) hepatitis: C, liver disease:,           Endo/Other:                     Abdominal:             Vascular: Other Findings:             Anesthesia Plan      MAC     ASA 2       Induction: intravenous. Anesthetic plan and risks discussed with patient. Plan discussed with CRNA.                     Sidney Massey MD   10/24/2023

## 2023-10-24 NOTE — OP NOTE
Rolando Rodriguez M.D.  [facility], [address] [place] [address]  [phone]           Flexible Sigmoidoscopy Procedure Note    NAME: Audrey Gomez  :  1950  MRN:  [id number]    Indications: BRBPR    : Haily Arenas MD    Referring Provider:  Sinai Aguirre MD    Staff: Circulator: Matt Bansal RN  Endoscopy Technician: Bryson Dunn    Prosthetic devices, grafts, tissues, transplant, or devices implanted: none    Medicines:  MAC anesthesia      Procedure Details:  After informed consent was obtained with all risks and benefits of the procedure explained and preprocedure exam completed, the patient was placed in the left lateral decubitus position. Universal protocol for patient identification was performed and documented in the nursing notes. Throughout the procedure, the patient's blood pressure was monitored at least every five minutes; pulse, and oxygen saturations were monitored continuously. All vital signs were documented in the nursing notes. A digital rectal exam was performed and was normal.  The Olympus videocolonoscope  was inserted in the rectum and carefully advanced to the sigmoid colon. The colonoscope was slowly withdrawn with careful evaluation between folds. Retroflexion in the rectum was performed; findings and interventions are described below. The quality of preparation was adequate. Findings:   Rectum: Small internal hemorrhoids, mild distal radiation proctopathy s/p APC at the rectal setting  Sigmoid: Moderate diverticulosis    Interventions:   APC of the rectum    Specimens:   * No specimens in log *    EBL:  None. Complications:   No immediate complications     Impression:  -See post-procedure diagnoses. No blood seen. APC of the rectum. Recommendations:   Monitor clinical course. If recurrent bleeding, next test to consider is repeat colonoscopy. Signed by:  Haily Arenas MD          10/24/2023  1:22 PM

## 2023-10-24 NOTE — DISCHARGE INSTRUCTIONS
[facility] Collin Rodriguez M.D.  [facility], [address] [facility]  [phone]            [facility] [address]  [id number]  1950    DISCOMFORT:  Redness at IV site- apply warm compress to area; if redness or soreness persist- contact your physician  There may be a slight amount of blood passed from the rectum  Gaseous discomfort- walking, belching will help relieve any discomfort  You may not operate a vehicle for 12 hours  You may not engage in an occupation involving machinery or appliances for the  rest of today  You may not drink alcoholic beverages for at least 12 hours  Avoid making any critical decisions for at least 24 hours    DIET:   You may resume your normal diet, but some patients find that heavy or large  meals may lead to indigestion or vomiting. I suggest a light meal as first food  intake. I recommend a whole food, plant-based diet for your overall health. ACTIVITY:  You may resume your normal daily activities. It is recommended that you spend the remainder of the day resting - avoid any strenuous activity. CALL M.D. IF ANY SIGN OF:   Increasing pain, nausea, vomiting  Abdominal distension (swelling)  Significant bleeding (oral or rectal)  Fever   Pain in chest area  Shortness of breath    Additional Instructions:   Call Dr. Nellie Rodriguez if any questions or problems at [phone]   Flex sig showed diverticulosis, small internal hemorrhoids, and mild radiation proctopathy s/p APC. No blood throughout the examination. Hemorrhoids: Care Instructions  Overview     Hemorrhoids are swollen veins that develop in the anal canal. Bleeding during bowel movements, itching, and rectal pain are the most common symptoms. Hemorrhoids can be uncomfortable at times, but rarely are they a serious problem. Most of the time, you can treat them with simple changes to your diet and bowel habits.  These changes include eating more

## 2024-01-21 RX ORDER — LISINOPRIL 40 MG/1
40 TABLET ORAL DAILY
Qty: 90 TABLET | Refills: 1 | Status: SHIPPED | OUTPATIENT
Start: 2024-01-21

## 2024-01-30 ENCOUNTER — HOSPITAL ENCOUNTER (OUTPATIENT)
Facility: HOSPITAL | Age: 74
Discharge: HOME | End: 2024-02-02

## 2024-01-30 VITALS
BODY MASS INDEX: 25.9 KG/M2 | HEIGHT: 71 IN | HEART RATE: 64 BPM | SYSTOLIC BLOOD PRESSURE: 157 MMHG | DIASTOLIC BLOOD PRESSURE: 79 MMHG | WEIGHT: 185 LBS

## 2024-01-30 DIAGNOSIS — C61 PROSTATE CANCER (HCC): Primary | ICD-10-CM

## 2024-01-30 ASSESSMENT — PAIN SCALES - GENERAL: PAINLEVEL_OUTOF10: 0

## 2024-01-30 NOTE — PROGRESS NOTES
Reactions    Latex Dermatitis    Acetaminophen Other (See Comments)     Due to liver disease  Other reaction(s): Unknown (comments)  Does not take because of liver issue  Does not take because of liver issue    Amlodipine Swelling     ankles       Current Outpatient Medications   Medication Instructions    aspirin 81 MG EC tablet Oral, DAILY    atorvastatin (LIPITOR) 40 mg, Oral, NIGHTLY    cetirizine (ZYRTEC) 10 MG tablet Oral, DAILY PRN    diclofenac (VOLTAREN) 50 mg, Oral, 2 TIMES DAILY PRN    fluticasone (FLONASE) 50 MCG/ACT nasal spray 2 sprays, Nasal, DAILY PRN    lisinopril (PRINIVIL;ZESTRIL) 40 mg, Oral, DAILY    Multiple Vitamins-Minerals (MULTIVITAMIN ADULT EXTRA C PO) 1 tablet, Oral      PQRS Medication Reconciliation: Medications documented and reviewed    Physical Exam:   Vitals: Blood pressure (!) 157/79, pulse 64, height 1.803 m (5' 11\"), weight 83.9 kg (185 lb).   Performance Status: ECOG 0, fully active, able to carry on all predisease activities without restrictions  Constitutional: Pleasant, sitting comfortably in no acute distress.   HEENT: Moist mucous membranes.  Cardiac: Good peripheral perfusion, no upper or lower extremity edema bilaterally.  Pulmonary: No increased work of breathing. Symmetric chest rise  Skin: Warm, dry, no rashes noted.  Neurologic: Alert and oriented.  Psychiatric: Cooperative to commands and responds to questions appropriately.    Assessment & Plan   Mr. Pham is a 73 y.o. male with a history of a regional risk prostate cancer, s/p RALP in 2013 - GS 3+4=7, pT2N0, salvage prostate bed RT alone in 2014, pelvic LN SBRT in 2019 at [facility], 2023 had a recurrent right iliac LN on PSMA PET and PSA 0.951 s/p SBRT (refused ADT or elective marilee coverage) to 4000cGy/5fx completed 5/2/23; PSA continuing to increase, now with PSMA+ pelvic Lns x3. Here for a discussion of radiation.     I have personally reviewed his imaging.  I believe he is a candidate for pelvic radiotherapy to

## 2024-02-20 ENCOUNTER — HOSPITAL ENCOUNTER (OUTPATIENT)
Facility: HOSPITAL | Age: 74
Discharge: HOME | End: 2024-02-23
Attending: RADIOLOGY

## 2024-02-20 DIAGNOSIS — E78.2 MIXED HYPERLIPIDEMIA: ICD-10-CM

## 2024-02-20 RX ORDER — ATORVASTATIN CALCIUM 40 MG/1
40 TABLET, FILM COATED ORAL NIGHTLY
Qty: 90 TABLET | Refills: 1 | Status: SHIPPED | OUTPATIENT
Start: 2024-02-20

## 2024-03-18 ENCOUNTER — HOSPITAL ENCOUNTER (OUTPATIENT)
Facility: HOSPITAL | Age: 74
Discharge: HOME | End: 2024-03-21
Attending: RADIOLOGY

## 2024-03-18 LAB
RAD ONC ARIA COURSE FIRST TREATMENT DATE: NORMAL
RAD ONC ARIA COURSE ID: NORMAL
RAD ONC ARIA COURSE INTENT: NORMAL
RAD ONC ARIA COURSE LAST TREATMENT DATE: NORMAL
RAD ONC ARIA COURSE SESSION NUMBER: 1
RAD ONC ARIA COURSE START DATE: NORMAL
RAD ONC ARIA COURSE TREATMENT ELAPSED DAYS: 0
RAD ONC ARIA PLAN FRACTIONS TREATED TO DATE: 1
RAD ONC ARIA PLAN ID: NORMAL
RAD ONC ARIA PLAN PRESCRIBED DOSE PER FRACTION: 1.8 GY
RAD ONC ARIA PLAN PRIMARY REFERENCE POINT: NORMAL
RAD ONC ARIA PLAN TOTAL FRACTIONS PRESCRIBED: 25
RAD ONC ARIA PLAN TOTAL PRESCRIBED DOSE: 4500 CGY
RAD ONC ARIA REFERENCE POINT DOSAGE GIVEN TO DATE: 1.8 GY
RAD ONC ARIA REFERENCE POINT DOSAGE GIVEN TO DATE: 1.85 GY
RAD ONC ARIA REFERENCE POINT DOSAGE GIVEN TO DATE: 2.6 GY
RAD ONC ARIA REFERENCE POINT DOSAGE GIVEN TO DATE: 2.6 GY
RAD ONC ARIA REFERENCE POINT ID: NORMAL
RAD ONC ARIA REFERENCE POINT SESSION DOSAGE GIVEN: 1.8 GY
RAD ONC ARIA REFERENCE POINT SESSION DOSAGE GIVEN: 1.85 GY
RAD ONC ARIA REFERENCE POINT SESSION DOSAGE GIVEN: 2.6 GY
RAD ONC ARIA REFERENCE POINT SESSION DOSAGE GIVEN: 2.6 GY

## 2024-03-19 ENCOUNTER — HOSPITAL ENCOUNTER (OUTPATIENT)
Facility: HOSPITAL | Age: 74
Discharge: HOME | End: 2024-03-22
Attending: RADIOLOGY

## 2024-03-19 LAB
RAD ONC ARIA COURSE FIRST TREATMENT DATE: NORMAL
RAD ONC ARIA COURSE ID: NORMAL
RAD ONC ARIA COURSE INTENT: NORMAL
RAD ONC ARIA COURSE LAST TREATMENT DATE: NORMAL
RAD ONC ARIA COURSE SESSION NUMBER: 2
RAD ONC ARIA COURSE START DATE: NORMAL
RAD ONC ARIA COURSE TREATMENT ELAPSED DAYS: 1
RAD ONC ARIA PLAN FRACTIONS TREATED TO DATE: 2
RAD ONC ARIA PLAN ID: NORMAL
RAD ONC ARIA PLAN PRESCRIBED DOSE PER FRACTION: 1.8 GY
RAD ONC ARIA PLAN PRIMARY REFERENCE POINT: NORMAL
RAD ONC ARIA PLAN TOTAL FRACTIONS PRESCRIBED: 25
RAD ONC ARIA PLAN TOTAL PRESCRIBED DOSE: 4500 CGY
RAD ONC ARIA REFERENCE POINT DOSAGE GIVEN TO DATE: 3.6 GY
RAD ONC ARIA REFERENCE POINT DOSAGE GIVEN TO DATE: 3.7 GY
RAD ONC ARIA REFERENCE POINT DOSAGE GIVEN TO DATE: 5.2 GY
RAD ONC ARIA REFERENCE POINT DOSAGE GIVEN TO DATE: 5.2 GY
RAD ONC ARIA REFERENCE POINT ID: NORMAL
RAD ONC ARIA REFERENCE POINT SESSION DOSAGE GIVEN: 1.8 GY
RAD ONC ARIA REFERENCE POINT SESSION DOSAGE GIVEN: 1.85 GY
RAD ONC ARIA REFERENCE POINT SESSION DOSAGE GIVEN: 2.6 GY
RAD ONC ARIA REFERENCE POINT SESSION DOSAGE GIVEN: 2.6 GY

## 2024-03-20 ENCOUNTER — HOSPITAL ENCOUNTER (OUTPATIENT)
Facility: HOSPITAL | Age: 74
Discharge: HOME | End: 2024-03-23
Attending: RADIOLOGY

## 2024-03-20 LAB
RAD ONC ARIA COURSE FIRST TREATMENT DATE: NORMAL
RAD ONC ARIA COURSE ID: NORMAL
RAD ONC ARIA COURSE INTENT: NORMAL
RAD ONC ARIA COURSE LAST TREATMENT DATE: NORMAL
RAD ONC ARIA COURSE SESSION NUMBER: 3
RAD ONC ARIA COURSE START DATE: NORMAL
RAD ONC ARIA COURSE TREATMENT ELAPSED DAYS: 2
RAD ONC ARIA PLAN FRACTIONS TREATED TO DATE: 3
RAD ONC ARIA PLAN ID: NORMAL
RAD ONC ARIA PLAN PRESCRIBED DOSE PER FRACTION: 1.8 GY
RAD ONC ARIA PLAN PRIMARY REFERENCE POINT: NORMAL
RAD ONC ARIA PLAN TOTAL FRACTIONS PRESCRIBED: 25
RAD ONC ARIA PLAN TOTAL PRESCRIBED DOSE: 4500 CGY
RAD ONC ARIA REFERENCE POINT DOSAGE GIVEN TO DATE: 5.4 GY
RAD ONC ARIA REFERENCE POINT DOSAGE GIVEN TO DATE: 5.55 GY
RAD ONC ARIA REFERENCE POINT DOSAGE GIVEN TO DATE: 7.8 GY
RAD ONC ARIA REFERENCE POINT DOSAGE GIVEN TO DATE: 7.8 GY
RAD ONC ARIA REFERENCE POINT ID: NORMAL
RAD ONC ARIA REFERENCE POINT SESSION DOSAGE GIVEN: 1.8 GY
RAD ONC ARIA REFERENCE POINT SESSION DOSAGE GIVEN: 1.85 GY
RAD ONC ARIA REFERENCE POINT SESSION DOSAGE GIVEN: 2.6 GY
RAD ONC ARIA REFERENCE POINT SESSION DOSAGE GIVEN: 2.6 GY

## 2024-03-21 ENCOUNTER — HOSPITAL ENCOUNTER (OUTPATIENT)
Facility: HOSPITAL | Age: 74
Discharge: HOME | End: 2024-03-24
Attending: RADIOLOGY

## 2024-03-21 LAB
RAD ONC ARIA COURSE FIRST TREATMENT DATE: NORMAL
RAD ONC ARIA COURSE ID: NORMAL
RAD ONC ARIA COURSE INTENT: NORMAL
RAD ONC ARIA COURSE LAST TREATMENT DATE: NORMAL
RAD ONC ARIA COURSE SESSION NUMBER: 4
RAD ONC ARIA COURSE START DATE: NORMAL
RAD ONC ARIA COURSE TREATMENT ELAPSED DAYS: 3
RAD ONC ARIA PLAN FRACTIONS TREATED TO DATE: 4
RAD ONC ARIA PLAN ID: NORMAL
RAD ONC ARIA PLAN PRESCRIBED DOSE PER FRACTION: 1.8 GY
RAD ONC ARIA PLAN PRIMARY REFERENCE POINT: NORMAL
RAD ONC ARIA PLAN TOTAL FRACTIONS PRESCRIBED: 25
RAD ONC ARIA PLAN TOTAL PRESCRIBED DOSE: 4500 CGY
RAD ONC ARIA REFERENCE POINT DOSAGE GIVEN TO DATE: 10.4 GY
RAD ONC ARIA REFERENCE POINT DOSAGE GIVEN TO DATE: 10.4 GY
RAD ONC ARIA REFERENCE POINT DOSAGE GIVEN TO DATE: 7.2 GY
RAD ONC ARIA REFERENCE POINT DOSAGE GIVEN TO DATE: 7.39 GY
RAD ONC ARIA REFERENCE POINT ID: NORMAL
RAD ONC ARIA REFERENCE POINT SESSION DOSAGE GIVEN: 1.8 GY
RAD ONC ARIA REFERENCE POINT SESSION DOSAGE GIVEN: 1.85 GY
RAD ONC ARIA REFERENCE POINT SESSION DOSAGE GIVEN: 2.6 GY
RAD ONC ARIA REFERENCE POINT SESSION DOSAGE GIVEN: 2.6 GY

## 2024-03-22 ENCOUNTER — HOSPITAL ENCOUNTER (OUTPATIENT)
Facility: HOSPITAL | Age: 74
Discharge: HOME | End: 2024-03-25
Attending: RADIOLOGY

## 2024-03-22 LAB
RAD ONC ARIA COURSE FIRST TREATMENT DATE: NORMAL
RAD ONC ARIA COURSE ID: NORMAL
RAD ONC ARIA COURSE INTENT: NORMAL
RAD ONC ARIA COURSE LAST TREATMENT DATE: NORMAL
RAD ONC ARIA COURSE SESSION NUMBER: 5
RAD ONC ARIA COURSE START DATE: NORMAL
RAD ONC ARIA COURSE TREATMENT ELAPSED DAYS: 4
RAD ONC ARIA PLAN FRACTIONS TREATED TO DATE: 5
RAD ONC ARIA PLAN ID: NORMAL
RAD ONC ARIA PLAN PRESCRIBED DOSE PER FRACTION: 1.8 GY
RAD ONC ARIA PLAN PRIMARY REFERENCE POINT: NORMAL
RAD ONC ARIA PLAN TOTAL FRACTIONS PRESCRIBED: 25
RAD ONC ARIA PLAN TOTAL PRESCRIBED DOSE: 4500 CGY
RAD ONC ARIA REFERENCE POINT DOSAGE GIVEN TO DATE: 13 GY
RAD ONC ARIA REFERENCE POINT DOSAGE GIVEN TO DATE: 13 GY
RAD ONC ARIA REFERENCE POINT DOSAGE GIVEN TO DATE: 9 GY
RAD ONC ARIA REFERENCE POINT DOSAGE GIVEN TO DATE: 9.24 GY
RAD ONC ARIA REFERENCE POINT ID: NORMAL
RAD ONC ARIA REFERENCE POINT SESSION DOSAGE GIVEN: 1.8 GY
RAD ONC ARIA REFERENCE POINT SESSION DOSAGE GIVEN: 1.85 GY
RAD ONC ARIA REFERENCE POINT SESSION DOSAGE GIVEN: 2.6 GY
RAD ONC ARIA REFERENCE POINT SESSION DOSAGE GIVEN: 2.6 GY

## 2024-03-22 NOTE — PROGRESS NOTES
[facility]   [facility]    Radiation Oncology Weekly Progress Note  Encounter Date: 03/22/24    Rivera Pham  [id number]  1950     Diagnosis   Progressive Prostate Cancer with Pelvic Lns:  regional risk prostate cancer, s/p RALP in 2013 - GS 3+4=7, pT2N0, salvage prostate bed RT alone in 2014, pelvic LN SBRT in 2019 at [facility], 2023 had a recurrent right iliac LN on PSMA PET and PSA 0.951 s/p SBRT (refused ADT or elective marilee coverage) to 4000cGy/5fx completed 5/2/23; PSA continuing to increase, now with PSMA+ pelvic Lns x3    AJCC Staging has been reviewed.    Interval History   Mr. Pham is a 73 y.o. male seen today for his weekly on-treatment evaluation    3/22/24 new start this week.  No  or GI complaints.  Has noted hot flashes from ADT    Treatment Details:   Treatment Site:  Treatment Technique:    Treatment Site Dose/Fx (cGy) #Fx Current Dose (cGy) Total Planned Dose (cGy) Start Date End Date   Pelvic nodes 250/180 5/25 1250/900 6500/4500 3/18/24 3/22/24     Concurrent Therapy: Concurrent ADT:  Response to treatment: N/A    Allergies and Medications     Allergies   Allergen Reactions    Latex Dermatitis    Acetaminophen Other (See Comments)     Due to liver disease  Other reaction(s): Unknown (comments)  Does not take because of liver issue  Does not take because of liver issue    Amlodipine Swelling     ankles       Current Outpatient Medications   Medication Instructions    aspirin 81 MG EC tablet Oral, DAILY    atorvastatin (LIPITOR) 40 mg, Oral, NIGHTLY    cetirizine (ZYRTEC) 10 MG tablet Oral, DAILY PRN    diclofenac (VOLTAREN) 50 mg, Oral, 2 TIMES DAILY PRN    fluticasone (FLONASE) 50 MCG/ACT nasal spray 2 sprays, Nasal, DAILY PRN    lisinopril (PRINIVIL;ZESTRIL) 40 mg, Oral, DAILY    Multiple Vitamins-Minerals (MULTIVITAMIN ADULT EXTRA C PO) 1 tablet, Oral        Physical Exam:   Vitals: There were no vitals taken for this visit.    Performance Status: ECOG 0,

## 2024-03-25 ENCOUNTER — HOSPITAL ENCOUNTER (OUTPATIENT)
Facility: HOSPITAL | Age: 74
Discharge: HOME | End: 2024-03-28
Attending: RADIOLOGY

## 2024-03-25 LAB
RAD ONC ARIA COURSE FIRST TREATMENT DATE: NORMAL
RAD ONC ARIA COURSE ID: NORMAL
RAD ONC ARIA COURSE INTENT: NORMAL
RAD ONC ARIA COURSE LAST TREATMENT DATE: NORMAL
RAD ONC ARIA COURSE SESSION NUMBER: 6
RAD ONC ARIA COURSE START DATE: NORMAL
RAD ONC ARIA COURSE TREATMENT ELAPSED DAYS: 7
RAD ONC ARIA PLAN FRACTIONS TREATED TO DATE: 6
RAD ONC ARIA PLAN ID: NORMAL
RAD ONC ARIA PLAN PRESCRIBED DOSE PER FRACTION: 1.8 GY
RAD ONC ARIA PLAN PRIMARY REFERENCE POINT: NORMAL
RAD ONC ARIA PLAN TOTAL FRACTIONS PRESCRIBED: 25
RAD ONC ARIA PLAN TOTAL PRESCRIBED DOSE: 4500 CGY
RAD ONC ARIA REFERENCE POINT DOSAGE GIVEN TO DATE: 10.8 GY
RAD ONC ARIA REFERENCE POINT DOSAGE GIVEN TO DATE: 11.09 GY
RAD ONC ARIA REFERENCE POINT DOSAGE GIVEN TO DATE: 15.6 GY
RAD ONC ARIA REFERENCE POINT DOSAGE GIVEN TO DATE: 15.6 GY
RAD ONC ARIA REFERENCE POINT ID: NORMAL
RAD ONC ARIA REFERENCE POINT SESSION DOSAGE GIVEN: 1.8 GY
RAD ONC ARIA REFERENCE POINT SESSION DOSAGE GIVEN: 1.85 GY
RAD ONC ARIA REFERENCE POINT SESSION DOSAGE GIVEN: 2.6 GY
RAD ONC ARIA REFERENCE POINT SESSION DOSAGE GIVEN: 2.6 GY

## 2024-03-26 ENCOUNTER — HOSPITAL ENCOUNTER (OUTPATIENT)
Facility: HOSPITAL | Age: 74
Discharge: HOME | End: 2024-03-29
Attending: RADIOLOGY

## 2024-03-26 LAB
RAD ONC ARIA COURSE FIRST TREATMENT DATE: NORMAL
RAD ONC ARIA COURSE ID: NORMAL
RAD ONC ARIA COURSE INTENT: NORMAL
RAD ONC ARIA COURSE LAST TREATMENT DATE: NORMAL
RAD ONC ARIA COURSE SESSION NUMBER: 7
RAD ONC ARIA COURSE START DATE: NORMAL
RAD ONC ARIA COURSE TREATMENT ELAPSED DAYS: 8
RAD ONC ARIA PLAN FRACTIONS TREATED TO DATE: 7
RAD ONC ARIA PLAN ID: NORMAL
RAD ONC ARIA PLAN PRESCRIBED DOSE PER FRACTION: 1.8 GY
RAD ONC ARIA PLAN PRIMARY REFERENCE POINT: NORMAL
RAD ONC ARIA PLAN TOTAL FRACTIONS PRESCRIBED: 25
RAD ONC ARIA PLAN TOTAL PRESCRIBED DOSE: 4500 CGY
RAD ONC ARIA REFERENCE POINT DOSAGE GIVEN TO DATE: 12.6 GY
RAD ONC ARIA REFERENCE POINT DOSAGE GIVEN TO DATE: 12.94 GY
RAD ONC ARIA REFERENCE POINT DOSAGE GIVEN TO DATE: 18.2 GY
RAD ONC ARIA REFERENCE POINT DOSAGE GIVEN TO DATE: 18.2 GY
RAD ONC ARIA REFERENCE POINT ID: NORMAL
RAD ONC ARIA REFERENCE POINT SESSION DOSAGE GIVEN: 1.8 GY
RAD ONC ARIA REFERENCE POINT SESSION DOSAGE GIVEN: 1.85 GY
RAD ONC ARIA REFERENCE POINT SESSION DOSAGE GIVEN: 2.6 GY
RAD ONC ARIA REFERENCE POINT SESSION DOSAGE GIVEN: 2.6 GY

## 2024-03-27 ENCOUNTER — HOSPITAL ENCOUNTER (OUTPATIENT)
Facility: HOSPITAL | Age: 74
Discharge: HOME | End: 2024-03-30
Attending: RADIOLOGY

## 2024-03-27 LAB
RAD ONC ARIA COURSE FIRST TREATMENT DATE: NORMAL
RAD ONC ARIA COURSE ID: NORMAL
RAD ONC ARIA COURSE INTENT: NORMAL
RAD ONC ARIA COURSE LAST TREATMENT DATE: NORMAL
RAD ONC ARIA COURSE SESSION NUMBER: 8
RAD ONC ARIA COURSE START DATE: NORMAL
RAD ONC ARIA COURSE TREATMENT ELAPSED DAYS: 9
RAD ONC ARIA PLAN FRACTIONS TREATED TO DATE: 8
RAD ONC ARIA PLAN ID: NORMAL
RAD ONC ARIA PLAN PRESCRIBED DOSE PER FRACTION: 1.8 GY
RAD ONC ARIA PLAN PRIMARY REFERENCE POINT: NORMAL
RAD ONC ARIA PLAN TOTAL FRACTIONS PRESCRIBED: 25
RAD ONC ARIA PLAN TOTAL PRESCRIBED DOSE: 4500 CGY
RAD ONC ARIA REFERENCE POINT DOSAGE GIVEN TO DATE: 14.4 GY
RAD ONC ARIA REFERENCE POINT DOSAGE GIVEN TO DATE: 14.79 GY
RAD ONC ARIA REFERENCE POINT DOSAGE GIVEN TO DATE: 20.8 GY
RAD ONC ARIA REFERENCE POINT DOSAGE GIVEN TO DATE: 20.8 GY
RAD ONC ARIA REFERENCE POINT ID: NORMAL
RAD ONC ARIA REFERENCE POINT SESSION DOSAGE GIVEN: 1.8 GY
RAD ONC ARIA REFERENCE POINT SESSION DOSAGE GIVEN: 1.85 GY
RAD ONC ARIA REFERENCE POINT SESSION DOSAGE GIVEN: 2.6 GY
RAD ONC ARIA REFERENCE POINT SESSION DOSAGE GIVEN: 2.6 GY

## 2024-03-28 ENCOUNTER — HOSPITAL ENCOUNTER (OUTPATIENT)
Facility: HOSPITAL | Age: 74
Discharge: HOME | End: 2024-03-31
Attending: RADIOLOGY

## 2024-03-28 LAB
RAD ONC ARIA COURSE FIRST TREATMENT DATE: NORMAL
RAD ONC ARIA COURSE ID: NORMAL
RAD ONC ARIA COURSE INTENT: NORMAL
RAD ONC ARIA COURSE LAST TREATMENT DATE: NORMAL
RAD ONC ARIA COURSE SESSION NUMBER: 9
RAD ONC ARIA COURSE START DATE: NORMAL
RAD ONC ARIA COURSE TREATMENT ELAPSED DAYS: 10
RAD ONC ARIA PLAN FRACTIONS TREATED TO DATE: 9
RAD ONC ARIA PLAN ID: NORMAL
RAD ONC ARIA PLAN PRESCRIBED DOSE PER FRACTION: 1.8 GY
RAD ONC ARIA PLAN PRIMARY REFERENCE POINT: NORMAL
RAD ONC ARIA PLAN TOTAL FRACTIONS PRESCRIBED: 25
RAD ONC ARIA PLAN TOTAL PRESCRIBED DOSE: 4500 CGY
RAD ONC ARIA REFERENCE POINT DOSAGE GIVEN TO DATE: 16.2 GY
RAD ONC ARIA REFERENCE POINT DOSAGE GIVEN TO DATE: 16.64 GY
RAD ONC ARIA REFERENCE POINT DOSAGE GIVEN TO DATE: 23.4 GY
RAD ONC ARIA REFERENCE POINT DOSAGE GIVEN TO DATE: 23.4 GY
RAD ONC ARIA REFERENCE POINT ID: NORMAL
RAD ONC ARIA REFERENCE POINT SESSION DOSAGE GIVEN: 1.8 GY
RAD ONC ARIA REFERENCE POINT SESSION DOSAGE GIVEN: 1.85 GY
RAD ONC ARIA REFERENCE POINT SESSION DOSAGE GIVEN: 2.6 GY
RAD ONC ARIA REFERENCE POINT SESSION DOSAGE GIVEN: 2.6 GY

## 2024-03-29 ENCOUNTER — HOSPITAL ENCOUNTER (OUTPATIENT)
Facility: HOSPITAL | Age: 74
Discharge: HOME | End: 2024-04-01
Attending: RADIOLOGY

## 2024-03-29 DIAGNOSIS — C61 PROSTATE CANCER (HCC): Primary | ICD-10-CM

## 2024-03-29 DIAGNOSIS — C77.5 SECONDARY AND UNSPECIFIED MALIGNANT NEOPLASM OF INTRAPELVIC LYMPH NODES (HCC): ICD-10-CM

## 2024-03-29 LAB
RAD ONC ARIA COURSE FIRST TREATMENT DATE: NORMAL
RAD ONC ARIA COURSE ID: NORMAL
RAD ONC ARIA COURSE INTENT: NORMAL
RAD ONC ARIA COURSE LAST TREATMENT DATE: NORMAL
RAD ONC ARIA COURSE SESSION NUMBER: 10
RAD ONC ARIA COURSE START DATE: NORMAL
RAD ONC ARIA COURSE TREATMENT ELAPSED DAYS: 11
RAD ONC ARIA PLAN FRACTIONS TREATED TO DATE: 10
RAD ONC ARIA PLAN ID: NORMAL
RAD ONC ARIA PLAN PRESCRIBED DOSE PER FRACTION: 1.8 GY
RAD ONC ARIA PLAN PRIMARY REFERENCE POINT: NORMAL
RAD ONC ARIA PLAN TOTAL FRACTIONS PRESCRIBED: 25
RAD ONC ARIA PLAN TOTAL PRESCRIBED DOSE: 4500 CGY
RAD ONC ARIA REFERENCE POINT DOSAGE GIVEN TO DATE: 18 GY
RAD ONC ARIA REFERENCE POINT DOSAGE GIVEN TO DATE: 18.48 GY
RAD ONC ARIA REFERENCE POINT DOSAGE GIVEN TO DATE: 26 GY
RAD ONC ARIA REFERENCE POINT DOSAGE GIVEN TO DATE: 26 GY
RAD ONC ARIA REFERENCE POINT ID: NORMAL
RAD ONC ARIA REFERENCE POINT SESSION DOSAGE GIVEN: 1.8 GY
RAD ONC ARIA REFERENCE POINT SESSION DOSAGE GIVEN: 1.85 GY
RAD ONC ARIA REFERENCE POINT SESSION DOSAGE GIVEN: 2.6 GY
RAD ONC ARIA REFERENCE POINT SESSION DOSAGE GIVEN: 2.6 GY

## 2024-03-29 ASSESSMENT — PAIN SCALES - GENERAL: PAINLEVEL_OUTOF10: 0

## 2024-03-29 NOTE — PROGRESS NOTES
[facility]   [facility]    Radiation Oncology Weekly Progress Note  Encounter Date: 03/29/24    Rivera Pham  [id number]  1950     Diagnosis   C61, C77.5: Progressive Prostate Cancer with Pelvic Lns:  regional risk prostate cancer, s/p RALP in 2013 - GS 3+4=7, pT2N0, salvage prostate bed RT alone in 2014, pelvic LN SBRT in 2019 at [facility], 2023 had a recurrent right iliac LN on PSMA PET and PSA 0.951 s/p SBRT (refused ADT or elective marilee coverage) to 4000cGy/5fx completed 5/2/23; PSA continuing to increase, now with PSMA+ pelvic Lns x3    AJCC Staging has been reviewed.  Interval History   Mr. Pham is a 73 y.o. male seen today for his weekly on-treatment evaluation    03/22/24 new start this week.  No  or GI complaints.  Has noted hot flashes from ADT  03/29/24: at fraction 10, doing well today, no  issues. Did have a few episodes of diarrhea which he attributes to diet, took one imodium without any further episodes.     Treatment Details:   Treatment Site: Pelvic lymph nodes with gross node boost  Treatment Technique: IMRT/VMAT  Treatment Site Dose/Fx (cGy) #Fx Current Dose (cGy) Total Planned Dose (cGy) Start Date End Date   Pelvic nodes 260/180 10/25 2600/1800 6500/4500 3/18/24 3/22/24     Concurrent Therapy: Concurrent ADT:  Response to treatment: N/A    Allergies and Medications     Allergies   Allergen Reactions    Latex Dermatitis    Acetaminophen Other (See Comments)     Due to liver disease  Other reaction(s): Unknown (comments)  Does not take because of liver issue  Does not take because of liver issue    Amlodipine Swelling     ankles       Current Outpatient Medications   Medication Instructions    aspirin 81 MG EC tablet Oral, DAILY    atorvastatin (LIPITOR) 40 mg, Oral, NIGHTLY    cetirizine (ZYRTEC) 10 MG tablet Oral, DAILY PRN    diclofenac (VOLTAREN) 50 mg, Oral, 2 TIMES DAILY PRN    fluticasone (FLONASE) 50 MCG/ACT nasal spray 2 sprays, Nasal, DAILY PRN

## 2024-04-01 ENCOUNTER — HOSPITAL ENCOUNTER (OUTPATIENT)
Facility: HOSPITAL | Age: 74
Discharge: HOME | End: 2024-04-04
Attending: RADIOLOGY

## 2024-04-01 LAB
RAD ONC ARIA COURSE FIRST TREATMENT DATE: NORMAL
RAD ONC ARIA COURSE ID: NORMAL
RAD ONC ARIA COURSE INTENT: NORMAL
RAD ONC ARIA COURSE LAST TREATMENT DATE: NORMAL
RAD ONC ARIA COURSE SESSION NUMBER: 11
RAD ONC ARIA COURSE START DATE: NORMAL
RAD ONC ARIA COURSE TREATMENT ELAPSED DAYS: 14
RAD ONC ARIA PLAN FRACTIONS TREATED TO DATE: 11
RAD ONC ARIA PLAN ID: NORMAL
RAD ONC ARIA PLAN PRESCRIBED DOSE PER FRACTION: 1.8 GY
RAD ONC ARIA PLAN PRIMARY REFERENCE POINT: NORMAL
RAD ONC ARIA PLAN TOTAL FRACTIONS PRESCRIBED: 25
RAD ONC ARIA PLAN TOTAL PRESCRIBED DOSE: 4500 CGY
RAD ONC ARIA REFERENCE POINT DOSAGE GIVEN TO DATE: 19.8 GY
RAD ONC ARIA REFERENCE POINT DOSAGE GIVEN TO DATE: 20.33 GY
RAD ONC ARIA REFERENCE POINT DOSAGE GIVEN TO DATE: 28.6 GY
RAD ONC ARIA REFERENCE POINT DOSAGE GIVEN TO DATE: 28.6 GY
RAD ONC ARIA REFERENCE POINT ID: NORMAL
RAD ONC ARIA REFERENCE POINT SESSION DOSAGE GIVEN: 1.8 GY
RAD ONC ARIA REFERENCE POINT SESSION DOSAGE GIVEN: 1.85 GY
RAD ONC ARIA REFERENCE POINT SESSION DOSAGE GIVEN: 2.6 GY
RAD ONC ARIA REFERENCE POINT SESSION DOSAGE GIVEN: 2.6 GY

## 2024-04-02 ENCOUNTER — HOSPITAL ENCOUNTER (OUTPATIENT)
Facility: HOSPITAL | Age: 74
Discharge: HOME | End: 2024-04-05
Attending: RADIOLOGY

## 2024-04-02 LAB
RAD ONC ARIA COURSE FIRST TREATMENT DATE: NORMAL
RAD ONC ARIA COURSE ID: NORMAL
RAD ONC ARIA COURSE INTENT: NORMAL
RAD ONC ARIA COURSE LAST TREATMENT DATE: NORMAL
RAD ONC ARIA COURSE SESSION NUMBER: 12
RAD ONC ARIA COURSE START DATE: NORMAL
RAD ONC ARIA COURSE TREATMENT ELAPSED DAYS: 15
RAD ONC ARIA PLAN FRACTIONS TREATED TO DATE: 12
RAD ONC ARIA PLAN ID: NORMAL
RAD ONC ARIA PLAN PRESCRIBED DOSE PER FRACTION: 1.8 GY
RAD ONC ARIA PLAN PRIMARY REFERENCE POINT: NORMAL
RAD ONC ARIA PLAN TOTAL FRACTIONS PRESCRIBED: 25
RAD ONC ARIA PLAN TOTAL PRESCRIBED DOSE: 4500 CGY
RAD ONC ARIA REFERENCE POINT DOSAGE GIVEN TO DATE: 21.6 GY
RAD ONC ARIA REFERENCE POINT DOSAGE GIVEN TO DATE: 22.18 GY
RAD ONC ARIA REFERENCE POINT DOSAGE GIVEN TO DATE: 31.2 GY
RAD ONC ARIA REFERENCE POINT DOSAGE GIVEN TO DATE: 31.2 GY
RAD ONC ARIA REFERENCE POINT ID: NORMAL
RAD ONC ARIA REFERENCE POINT SESSION DOSAGE GIVEN: 1.8 GY
RAD ONC ARIA REFERENCE POINT SESSION DOSAGE GIVEN: 1.85 GY
RAD ONC ARIA REFERENCE POINT SESSION DOSAGE GIVEN: 2.6 GY
RAD ONC ARIA REFERENCE POINT SESSION DOSAGE GIVEN: 2.6 GY

## 2024-04-03 ENCOUNTER — APPOINTMENT (OUTPATIENT)
Facility: HOSPITAL | Age: 74
End: 2024-04-03
Payer: MEDICARE

## 2024-04-03 ENCOUNTER — HOSPITAL ENCOUNTER (EMERGENCY)
Facility: HOSPITAL | Age: 74
Discharge: HOME | End: 2024-04-03
Attending: STUDENT IN AN ORGANIZED HEALTH CARE EDUCATION/TRAINING PROGRAM
Payer: MEDICARE

## 2024-04-03 ENCOUNTER — HOSPITAL ENCOUNTER (OUTPATIENT)
Facility: HOSPITAL | Age: 74
Discharge: HOME | End: 2024-04-06
Attending: RADIOLOGY

## 2024-04-03 VITALS
OXYGEN SATURATION: 96 % | RESPIRATION RATE: 18 BRPM | SYSTOLIC BLOOD PRESSURE: 173 MMHG | BODY MASS INDEX: 26.88 KG/M2 | WEIGHT: 192.02 LBS | HEIGHT: 71 IN | DIASTOLIC BLOOD PRESSURE: 90 MMHG | TEMPERATURE: 98.1 F | HEART RATE: 81 BPM

## 2024-04-03 DIAGNOSIS — S81.819A LACERATION OF SHIN: Primary | ICD-10-CM

## 2024-04-03 LAB
RAD ONC ARIA COURSE FIRST TREATMENT DATE: NORMAL
RAD ONC ARIA COURSE ID: NORMAL
RAD ONC ARIA COURSE INTENT: NORMAL
RAD ONC ARIA COURSE LAST TREATMENT DATE: NORMAL
RAD ONC ARIA COURSE SESSION NUMBER: 13
RAD ONC ARIA COURSE START DATE: NORMAL
RAD ONC ARIA COURSE TREATMENT ELAPSED DAYS: 16
RAD ONC ARIA PLAN FRACTIONS TREATED TO DATE: 13
RAD ONC ARIA PLAN ID: NORMAL
RAD ONC ARIA PLAN PRESCRIBED DOSE PER FRACTION: 1.8 GY
RAD ONC ARIA PLAN PRIMARY REFERENCE POINT: NORMAL
RAD ONC ARIA PLAN TOTAL FRACTIONS PRESCRIBED: 25
RAD ONC ARIA PLAN TOTAL PRESCRIBED DOSE: 4500 CGY
RAD ONC ARIA REFERENCE POINT DOSAGE GIVEN TO DATE: 23.4 GY
RAD ONC ARIA REFERENCE POINT DOSAGE GIVEN TO DATE: 24.03 GY
RAD ONC ARIA REFERENCE POINT DOSAGE GIVEN TO DATE: 33.8 GY
RAD ONC ARIA REFERENCE POINT DOSAGE GIVEN TO DATE: 33.8 GY
RAD ONC ARIA REFERENCE POINT ID: NORMAL
RAD ONC ARIA REFERENCE POINT SESSION DOSAGE GIVEN: 1.8 GY
RAD ONC ARIA REFERENCE POINT SESSION DOSAGE GIVEN: 1.85 GY
RAD ONC ARIA REFERENCE POINT SESSION DOSAGE GIVEN: 2.6 GY
RAD ONC ARIA REFERENCE POINT SESSION DOSAGE GIVEN: 2.6 GY

## 2024-04-03 PROCEDURE — 6370000000 HC RX 637 (ALT 250 FOR IP)

## 2024-04-03 PROCEDURE — 73590 X-RAY EXAM OF LOWER LEG: CPT

## 2024-04-03 PROCEDURE — 90471 IMMUNIZATION ADMIN: CPT

## 2024-04-03 PROCEDURE — 12001 RPR S/N/AX/GEN/TRNK 2.5CM/<: CPT

## 2024-04-03 PROCEDURE — 99284 EMERGENCY DEPT VISIT MOD MDM: CPT

## 2024-04-03 PROCEDURE — 6360000002 HC RX W HCPCS

## 2024-04-03 PROCEDURE — 90714 TD VACC NO PRESV 7 YRS+ IM: CPT

## 2024-04-03 RX ORDER — TETANUS AND DIPHTHERIA TOXOIDS ADSORBED 2; 2 [LF]/.5ML; [LF]/.5ML
0.5 INJECTION INTRAMUSCULAR ONCE
Status: COMPLETED | OUTPATIENT
Start: 2024-04-03 | End: 2024-04-03

## 2024-04-03 RX ORDER — LIDOCAINE HYDROCHLORIDE 10 MG/ML
10 INJECTION, SOLUTION EPIDURAL; INFILTRATION; INTRACAUDAL; PERINEURAL
Status: DISCONTINUED | OUTPATIENT
Start: 2024-04-03 | End: 2024-04-03 | Stop reason: HOSPADM

## 2024-04-03 RX ORDER — GINSENG 100 MG
CAPSULE ORAL ONCE
Status: COMPLETED | OUTPATIENT
Start: 2024-04-03 | End: 2024-04-03

## 2024-04-03 RX ADMIN — TETANUS AND DIPHTHERIA TOXOIDS ADSORBED 0.5 ML: 2; 2 INJECTION INTRAMUSCULAR at 15:47

## 2024-04-03 RX ADMIN — BACITRACIN: 500 OINTMENT TOPICAL at 15:51

## 2024-04-03 ASSESSMENT — PAIN - FUNCTIONAL ASSESSMENT
PAIN_FUNCTIONAL_ASSESSMENT: ACTIVITIES ARE NOT PREVENTED
PAIN_FUNCTIONAL_ASSESSMENT: 0-10

## 2024-04-03 ASSESSMENT — PAIN DESCRIPTION - ONSET: ONSET: ON-GOING

## 2024-04-03 ASSESSMENT — PAIN DESCRIPTION - LOCATION: LOCATION: LEG

## 2024-04-03 ASSESSMENT — PAIN DESCRIPTION - PAIN TYPE: TYPE: ACUTE PAIN

## 2024-04-03 ASSESSMENT — PAIN DESCRIPTION - DESCRIPTORS: DESCRIPTORS: ACHING;BURNING

## 2024-04-03 ASSESSMENT — PAIN SCALES - GENERAL: PAINLEVEL_OUTOF10: 1

## 2024-04-03 ASSESSMENT — PAIN DESCRIPTION - FREQUENCY: FREQUENCY: CONTINUOUS

## 2024-04-03 ASSESSMENT — PAIN DESCRIPTION - ORIENTATION: ORIENTATION: LEFT
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
## 2024-04-03 NOTE — DISCHARGE INSTRUCTIONS
You were seen and evaluated here today for a laceration.  You had 4 sutures placed.  For the first 24 hours, keep the area dry.  After that, it is okay for soap and water to run over the wound, just avoid scrubbing the area directly.  Return to your primary care provider, local urgent care, or this emergency department for suture removal in 10 days.

## 2024-04-04 ENCOUNTER — HOSPITAL ENCOUNTER (OUTPATIENT)
Facility: HOSPITAL | Age: 74
Discharge: HOME | End: 2024-04-07
Attending: RADIOLOGY

## 2024-04-04 LAB
RAD ONC ARIA COURSE FIRST TREATMENT DATE: NORMAL
RAD ONC ARIA COURSE ID: NORMAL
RAD ONC ARIA COURSE INTENT: NORMAL
RAD ONC ARIA COURSE LAST TREATMENT DATE: NORMAL
RAD ONC ARIA COURSE SESSION NUMBER: 14
RAD ONC ARIA COURSE START DATE: NORMAL
RAD ONC ARIA COURSE TREATMENT ELAPSED DAYS: 17
RAD ONC ARIA PLAN FRACTIONS TREATED TO DATE: 14
RAD ONC ARIA PLAN ID: NORMAL
RAD ONC ARIA PLAN PRESCRIBED DOSE PER FRACTION: 1.8 GY
RAD ONC ARIA PLAN PRIMARY REFERENCE POINT: NORMAL
RAD ONC ARIA PLAN TOTAL FRACTIONS PRESCRIBED: 25
RAD ONC ARIA PLAN TOTAL PRESCRIBED DOSE: 4500 CGY
RAD ONC ARIA REFERENCE POINT DOSAGE GIVEN TO DATE: 25.2 GY
RAD ONC ARIA REFERENCE POINT DOSAGE GIVEN TO DATE: 25.88 GY
RAD ONC ARIA REFERENCE POINT DOSAGE GIVEN TO DATE: 36.4 GY
RAD ONC ARIA REFERENCE POINT DOSAGE GIVEN TO DATE: 36.4 GY
RAD ONC ARIA REFERENCE POINT ID: NORMAL
RAD ONC ARIA REFERENCE POINT SESSION DOSAGE GIVEN: 1.8 GY
RAD ONC ARIA REFERENCE POINT SESSION DOSAGE GIVEN: 1.85 GY
RAD ONC ARIA REFERENCE POINT SESSION DOSAGE GIVEN: 2.6 GY
RAD ONC ARIA REFERENCE POINT SESSION DOSAGE GIVEN: 2.6 GY

## 2024-04-05 ENCOUNTER — HOSPITAL ENCOUNTER (OUTPATIENT)
Facility: HOSPITAL | Age: 74
Discharge: HOME | End: 2024-04-08
Attending: RADIOLOGY

## 2024-04-05 LAB
RAD ONC ARIA COURSE FIRST TREATMENT DATE: NORMAL
RAD ONC ARIA COURSE ID: NORMAL
RAD ONC ARIA COURSE INTENT: NORMAL
RAD ONC ARIA COURSE LAST TREATMENT DATE: NORMAL
RAD ONC ARIA COURSE SESSION NUMBER: 15
RAD ONC ARIA COURSE START DATE: NORMAL
RAD ONC ARIA COURSE TREATMENT ELAPSED DAYS: 18
RAD ONC ARIA PLAN FRACTIONS TREATED TO DATE: 15
RAD ONC ARIA PLAN ID: NORMAL
RAD ONC ARIA PLAN PRESCRIBED DOSE PER FRACTION: 1.8 GY
RAD ONC ARIA PLAN PRIMARY REFERENCE POINT: NORMAL
RAD ONC ARIA PLAN TOTAL FRACTIONS PRESCRIBED: 25
RAD ONC ARIA PLAN TOTAL PRESCRIBED DOSE: 4500 CGY
RAD ONC ARIA REFERENCE POINT DOSAGE GIVEN TO DATE: 27 GY
RAD ONC ARIA REFERENCE POINT DOSAGE GIVEN TO DATE: 27.73 GY
RAD ONC ARIA REFERENCE POINT DOSAGE GIVEN TO DATE: 39 GY
RAD ONC ARIA REFERENCE POINT DOSAGE GIVEN TO DATE: 39 GY
RAD ONC ARIA REFERENCE POINT ID: NORMAL
RAD ONC ARIA REFERENCE POINT SESSION DOSAGE GIVEN: 1.8 GY
RAD ONC ARIA REFERENCE POINT SESSION DOSAGE GIVEN: 1.85 GY
RAD ONC ARIA REFERENCE POINT SESSION DOSAGE GIVEN: 2.6 GY
RAD ONC ARIA REFERENCE POINT SESSION DOSAGE GIVEN: 2.6 GY

## 2024-04-05 ASSESSMENT — PAIN SCALES - GENERAL: PAINLEVEL_OUTOF10: 0

## 2024-04-05 NOTE — PROGRESS NOTES
[facility] [place]   [facility]    Radiation Oncology Weekly Progress Note  Encounter Date: 04/05/24    Rivera Pham  [id number]  1950     Diagnosis   C61, C77.5: Progressive Prostate Cancer with Pelvic Lns:  regional risk prostate cancer, s/p RALP in 2013 - GS 3+4=7, pT2N0, salvage prostate bed RT alone in 2014, pelvic LN SBRT in 2019 at [facility], 2023 had a recurrent right iliac LN on PSMA PET and PSA 0.951 s/p SBRT (refused ADT or elective marilee coverage) to 4000cGy/5fx completed 5/2/23; PSA continuing to increase, now with PSMA+ pelvic Lns x3    AJCC Staging has been reviewed.  Interval History   Mr. Pham is a 73 y.o. male seen today for his weekly on-treatment evaluation    03/22/24 new start this week.  No  or GI complaints.  Has noted hot flashes from ADT  03/29/24: at fraction 10, doing well today, no  issues. Did have a few episodes of diarrhea which he attributes to diet, took one imodium without any further episodes.   4/5/2024: Continues to do well, has not needed any imodium this week.  Had some gas discomfort last night and took Gas-X with good effect.  Has noticed his stamina has decreased, we discussed likely secondary to injury deprivation therapy.    Treatment Details:   Treatment Site: Pelvic lymph nodes with gross node boost  Treatment Technique: IMRT/VMAT  Treatment Site Dose/Fx (cGy) #Fx Current Dose (cGy) Total Planned Dose (cGy) Start Date End Date   Pelvic nodes 260/180 15/25 3900/2700 6500/4500 3/18/24 3/22/24     Concurrent Therapy: Concurrent ADT:  Response to treatment: N/A    Allergies and Medications     Allergies   Allergen Reactions    Latex Dermatitis    Acetaminophen Other (See Comments)     Due to liver disease  Other reaction(s): Unknown (comments)  Does not take because of liver issue  Does not take because of liver issue    Amlodipine Swelling     ankles       Current Outpatient Medications   Medication Instructions    aspirin 81 MG EC tablet

## 2024-04-06 SDOH — HEALTH STABILITY: PHYSICAL HEALTH: ON AVERAGE, HOW MANY DAYS PER WEEK DO YOU ENGAGE IN MODERATE TO STRENUOUS EXERCISE (LIKE A BRISK WALK)?: 7 DAYS

## 2024-04-06 SDOH — HEALTH STABILITY: PHYSICAL HEALTH: ON AVERAGE, HOW MANY MINUTES DO YOU ENGAGE IN EXERCISE AT THIS LEVEL?: 60 MIN

## 2024-04-06 ASSESSMENT — LIFESTYLE VARIABLES
HOW OFTEN DO YOU HAVE SIX OR MORE DRINKS ON ONE OCCASION: 1
HOW OFTEN DO YOU HAVE A DRINK CONTAINING ALCOHOL: 1
HOW OFTEN DO YOU HAVE A DRINK CONTAINING ALCOHOL: NEVER
HOW MANY STANDARD DRINKS CONTAINING ALCOHOL DO YOU HAVE ON A TYPICAL DAY: 0
HOW MANY STANDARD DRINKS CONTAINING ALCOHOL DO YOU HAVE ON A TYPICAL DAY: PATIENT DOES NOT DRINK

## 2024-04-06 ASSESSMENT — PATIENT HEALTH QUESTIONNAIRE - PHQ9
SUM OF ALL RESPONSES TO PHQ QUESTIONS 1-9: 0
SUM OF ALL RESPONSES TO PHQ9 QUESTIONS 1 & 2: 0
2. FEELING DOWN, DEPRESSED OR HOPELESS: NOT AT ALL
SUM OF ALL RESPONSES TO PHQ QUESTIONS 1-9: 0
1. LITTLE INTEREST OR PLEASURE IN DOING THINGS: NOT AT ALL
SUM OF ALL RESPONSES TO PHQ QUESTIONS 1-9: 0
SUM OF ALL RESPONSES TO PHQ QUESTIONS 1-9: 0

## 2024-04-08 ENCOUNTER — HOSPITAL ENCOUNTER (OUTPATIENT)
Facility: HOSPITAL | Age: 74
Discharge: HOME | End: 2024-04-11
Attending: RADIOLOGY

## 2024-04-08 LAB
RAD ONC ARIA COURSE FIRST TREATMENT DATE: NORMAL
RAD ONC ARIA COURSE ID: NORMAL
RAD ONC ARIA COURSE INTENT: NORMAL
RAD ONC ARIA COURSE LAST TREATMENT DATE: NORMAL
RAD ONC ARIA COURSE SESSION NUMBER: 16
RAD ONC ARIA COURSE START DATE: NORMAL
RAD ONC ARIA COURSE TREATMENT ELAPSED DAYS: 21
RAD ONC ARIA PLAN FRACTIONS TREATED TO DATE: 16
RAD ONC ARIA PLAN ID: NORMAL
RAD ONC ARIA PLAN PRESCRIBED DOSE PER FRACTION: 1.8 GY
RAD ONC ARIA PLAN PRIMARY REFERENCE POINT: NORMAL
RAD ONC ARIA PLAN TOTAL FRACTIONS PRESCRIBED: 25
RAD ONC ARIA PLAN TOTAL PRESCRIBED DOSE: 4500 CGY
RAD ONC ARIA REFERENCE POINT DOSAGE GIVEN TO DATE: 28.8 GY
RAD ONC ARIA REFERENCE POINT DOSAGE GIVEN TO DATE: 29.57 GY
RAD ONC ARIA REFERENCE POINT DOSAGE GIVEN TO DATE: 41.6 GY
RAD ONC ARIA REFERENCE POINT DOSAGE GIVEN TO DATE: 41.6 GY
RAD ONC ARIA REFERENCE POINT ID: NORMAL
RAD ONC ARIA REFERENCE POINT SESSION DOSAGE GIVEN: 1.8 GY
RAD ONC ARIA REFERENCE POINT SESSION DOSAGE GIVEN: 1.85 GY
RAD ONC ARIA REFERENCE POINT SESSION DOSAGE GIVEN: 2.6 GY
RAD ONC ARIA REFERENCE POINT SESSION DOSAGE GIVEN: 2.6 GY

## 2024-04-08 NOTE — PROGRESS NOTES
Medicare Annual Wellness Visit    Rivera Pham is here for Medicare AWV    Assessment & Plan   Medicare annual wellness visit, subsequent  -has up to date ACP on file  -[facility] decision maker reviewed with patient and updated.      2. Malignant neoplasm of prostate (HCC)  -metastatic with recurrence.  -in week 2 of 6 of Radiation treatment at this time.     3. Mixed hyperlipidemia  -taking atorvastatin 40mg daily.   -need fasting lipid panel at this time.     4. Essential (primary) hypertension  -controlled with use of lisinopril 40mg daily. No adjustments needed    -     CBC with Auto Differential; Future  -     Comprehensive Metabolic Panel; Future  -     Lipid Panel; Future    5. Impaired fasting glucose  -Maintain diet low in sugar and carbohydrates    -     Comprehensive Metabolic Panel; Future  -     Hemoglobin A1C; Future    6. Encounter for long-term (current) use of medications    7. Encounter for removal of sutures  -laceration healing well  -4 stitches removed without any complications.  Steristrips placed over laceration  -keep area clean and dry. Covered during the day, may uncover at night.     Orders Placed This Encounter    CBC with Auto Differential     Standing Status:   Future     Number of Occurrences:   1     Standing Expiration Date:   4/9/2025    Comprehensive Metabolic Panel     Standing Status:   Future     Number of Occurrences:   1     Standing Expiration Date:   4/9/2025    Lipid Panel     Standing Status:   Future     Number of Occurrences:   1     Standing Expiration Date:   4/9/2025     Order Specific Question:   Is Patient Fasting?/# of Hours     Answer:   8     Order Specific Question:   Has the patient fasted?     Answer:   Yes    Hemoglobin A1C     Standing Status:   Future     Number of Occurrences:   1     Standing Expiration Date:   4/9/2025    ORGOVYX 120 MG TABS        Recommendations for Preventive Services Due: see orders and patient instructions/AVS.  Recommended

## 2024-04-09 ENCOUNTER — HOSPITAL ENCOUNTER (OUTPATIENT)
Facility: HOSPITAL | Age: 74
Discharge: HOME | End: 2024-04-12
Attending: RADIOLOGY

## 2024-04-09 ENCOUNTER — OFFICE VISIT (OUTPATIENT)
Age: 74
End: 2024-04-09
Payer: MEDICARE

## 2024-04-09 VITALS
OXYGEN SATURATION: 98 % | WEIGHT: 187.8 LBS | SYSTOLIC BLOOD PRESSURE: 110 MMHG | RESPIRATION RATE: 16 BRPM | HEART RATE: 72 BPM | DIASTOLIC BLOOD PRESSURE: 70 MMHG | HEIGHT: 71 IN | BODY MASS INDEX: 26.29 KG/M2 | TEMPERATURE: 97.1 F

## 2024-04-09 DIAGNOSIS — R73.01 IMPAIRED FASTING GLUCOSE: ICD-10-CM

## 2024-04-09 DIAGNOSIS — I10 ESSENTIAL (PRIMARY) HYPERTENSION: ICD-10-CM

## 2024-04-09 DIAGNOSIS — E78.2 MIXED HYPERLIPIDEMIA: ICD-10-CM

## 2024-04-09 DIAGNOSIS — C61 MALIGNANT NEOPLASM OF PROSTATE (HCC): ICD-10-CM

## 2024-04-09 DIAGNOSIS — Z79.899 ENCOUNTER FOR LONG-TERM (CURRENT) USE OF MEDICATIONS: ICD-10-CM

## 2024-04-09 DIAGNOSIS — Z48.02 ENCOUNTER FOR REMOVAL OF SUTURES: ICD-10-CM

## 2024-04-09 DIAGNOSIS — Z00.00 MEDICARE ANNUAL WELLNESS VISIT, SUBSEQUENT: Primary | ICD-10-CM

## 2024-04-09 LAB
ALBUMIN SERPL-MCNC: 3.9 G/DL (ref 3.5–5)
ALBUMIN/GLOB SERPL: 1.4 (ref 1.1–2.2)
ALP SERPL-CCNC: 32 U/L (ref 45–117)
ALT SERPL-CCNC: 51 U/L (ref 12–78)
ANION GAP SERPL CALC-SCNC: 4 MMOL/L (ref 5–15)
AST SERPL-CCNC: 23 U/L (ref 15–37)
BASOPHILS # BLD: 0.1 K/UL (ref 0–0.1)
BASOPHILS NFR BLD: 1 % (ref 0–1)
BILIRUB SERPL-MCNC: 0.8 MG/DL (ref 0.2–1)
BUN SERPL-MCNC: 20 MG/DL (ref 6–20)
BUN/CREAT SERPL: 21 (ref 12–20)
CALCIUM SERPL-MCNC: 9.1 MG/DL (ref 8.5–10.1)
CHLORIDE SERPL-SCNC: 105 MMOL/L (ref 97–108)
CHOLEST SERPL-MCNC: 117 MG/DL
CO2 SERPL-SCNC: 31 MMOL/L (ref 21–32)
CREAT SERPL-MCNC: 0.97 MG/DL (ref 0.7–1.3)
DIFFERENTIAL METHOD BLD: ABNORMAL
EOSINOPHIL # BLD: 0.2 K/UL (ref 0–0.4)
EOSINOPHIL NFR BLD: 3 % (ref 0–7)
ERYTHROCYTE [DISTWIDTH] IN BLOOD BY AUTOMATED COUNT: 12.9 % (ref 11.5–14.5)
EST. AVERAGE GLUCOSE BLD GHB EST-MCNC: 117 MG/DL
GLOBULIN SER CALC-MCNC: 2.8 G/DL (ref 2–4)
GLUCOSE SERPL-MCNC: 101 MG/DL (ref 65–100)
HBA1C MFR BLD: 5.7 % (ref 4–5.6)
HCT VFR BLD AUTO: 40.5 % (ref 36.6–50.3)
HDLC SERPL-MCNC: 37 MG/DL
HDLC SERPL: 3.2 (ref 0–5)
HGB BLD-MCNC: 13.7 G/DL (ref 12.1–17)
IMM GRANULOCYTES # BLD AUTO: 0 K/UL (ref 0–0.04)
IMM GRANULOCYTES NFR BLD AUTO: 0 % (ref 0–0.5)
LDLC SERPL CALC-MCNC: 55.2 MG/DL (ref 0–100)
LYMPHOCYTES # BLD: 0.6 K/UL (ref 0.8–3.5)
LYMPHOCYTES NFR BLD: 12 % (ref 12–49)
MCH RBC QN AUTO: 32 PG (ref 26–34)
MCHC RBC AUTO-ENTMCNC: 33.8 G/DL (ref 30–36.5)
MCV RBC AUTO: 94.6 FL (ref 80–99)
MONOCYTES # BLD: 0.7 K/UL (ref 0–1)
MONOCYTES NFR BLD: 14 % (ref 5–13)
NEUTS SEG # BLD: 3.4 K/UL (ref 1.8–8)
NEUTS SEG NFR BLD: 70 % (ref 32–75)
NRBC # BLD: 0 K/UL (ref 0–0.01)
NRBC BLD-RTO: 0 PER 100 WBC
PLATELET # BLD AUTO: 125 K/UL (ref 150–400)
PMV BLD AUTO: 10.9 FL (ref 8.9–12.9)
POTASSIUM SERPL-SCNC: 5.2 MMOL/L (ref 3.5–5.1)
PROT SERPL-MCNC: 6.7 G/DL (ref 6.4–8.2)
RAD ONC ARIA COURSE FIRST TREATMENT DATE: NORMAL
RAD ONC ARIA COURSE ID: NORMAL
RAD ONC ARIA COURSE INTENT: NORMAL
RAD ONC ARIA COURSE LAST TREATMENT DATE: NORMAL
RAD ONC ARIA COURSE SESSION NUMBER: 17
RAD ONC ARIA COURSE START DATE: NORMAL
RAD ONC ARIA COURSE TREATMENT ELAPSED DAYS: 22
RAD ONC ARIA PLAN FRACTIONS TREATED TO DATE: 17
RAD ONC ARIA PLAN ID: NORMAL
RAD ONC ARIA PLAN PRESCRIBED DOSE PER FRACTION: 1.8 GY
RAD ONC ARIA PLAN PRIMARY REFERENCE POINT: NORMAL
RAD ONC ARIA PLAN TOTAL FRACTIONS PRESCRIBED: 25
RAD ONC ARIA PLAN TOTAL PRESCRIBED DOSE: 4500 CGY
RAD ONC ARIA REFERENCE POINT DOSAGE GIVEN TO DATE: 30.6 GY
RAD ONC ARIA REFERENCE POINT DOSAGE GIVEN TO DATE: 31.42 GY
RAD ONC ARIA REFERENCE POINT DOSAGE GIVEN TO DATE: 44.2 GY
RAD ONC ARIA REFERENCE POINT DOSAGE GIVEN TO DATE: 44.2 GY
RAD ONC ARIA REFERENCE POINT ID: NORMAL
RAD ONC ARIA REFERENCE POINT SESSION DOSAGE GIVEN: 1.8 GY
RAD ONC ARIA REFERENCE POINT SESSION DOSAGE GIVEN: 1.85 GY
RAD ONC ARIA REFERENCE POINT SESSION DOSAGE GIVEN: 2.6 GY
RAD ONC ARIA REFERENCE POINT SESSION DOSAGE GIVEN: 2.6 GY
RBC # BLD AUTO: 4.28 M/UL (ref 4.1–5.7)
RBC MORPH BLD: ABNORMAL
SODIUM SERPL-SCNC: 140 MMOL/L (ref 136–145)
TRIGL SERPL-MCNC: 124 MG/DL
VLDLC SERPL CALC-MCNC: 24.8 MG/DL
WBC # BLD AUTO: 5 K/UL (ref 4.1–11.1)

## 2024-04-09 PROCEDURE — 99213 OFFICE O/P EST LOW 20 MIN: CPT | Performed by: INTERNAL MEDICINE

## 2024-04-09 RX ORDER — RELUGOLIX 120 MG/1
TABLET, FILM COATED ORAL
COMMUNITY
Start: 2024-02-05

## 2024-04-09 NOTE — PROGRESS NOTES
robe record in preparation for visit and have obtained necessary documentation.    Identified pt with two pt identifiers(name and ).      Health Maintenance Due   Topic    COVID-19 Vaccine ( season)    Annual Wellness Visit (Medicare)          Chief Complaint   Patient presents with    Medicare AWV        Wt Readings from Last 3 Encounters:   24 85.2 kg (187 lb 12.8 oz)   24 87.1 kg (192 lb 0.3 oz)   24 83.9 kg (185 lb)     Temp Readings from Last 3 Encounters:   24 97.1 °F (36.2 °C) (Temporal)   24 98.1 °F (36.7 °C) (Tympanic)   10/24/23 98.1 °F (36.7 °C) (Tympanic)     BP Readings from Last 3 Encounters:   24 110/70   24 (!) 173/90   24 (!) 157/79     Pulse Readings from Last 3 Encounters:   24 72   24 81   24 64           Learning Assessment:  :    Failed to redirect to the Timeline version of the [employer] SmartLink.    Depression Screening:  :         No data to display                Fall Risk Assessment:  :    Failed to redirect to the Timeline version of the [employer] SmartLink.    Abuse Screening:  :         No data to display                Coordination of Care Questionnaire:  :    1) Have you been to an emergency room, urgent care clinic since your last visit? yes   Hospitalized since your last visit? no             2) Have you seen or consulted any other health care providers outside of [facility] since your last visit? no  (Include any pap smears or colon screenings in this section.)    3) Do you have an Advance Directive on file? no    4) Are you interested in receiving information on Advance Directives? No

## 2024-04-10 ENCOUNTER — HOSPITAL ENCOUNTER (OUTPATIENT)
Facility: HOSPITAL | Age: 74
Discharge: HOME | End: 2024-04-13
Attending: RADIOLOGY

## 2024-04-10 LAB
RAD ONC ARIA COURSE FIRST TREATMENT DATE: NORMAL
RAD ONC ARIA COURSE ID: NORMAL
RAD ONC ARIA COURSE INTENT: NORMAL
RAD ONC ARIA COURSE LAST TREATMENT DATE: NORMAL
RAD ONC ARIA COURSE SESSION NUMBER: 18
RAD ONC ARIA COURSE START DATE: NORMAL
RAD ONC ARIA COURSE TREATMENT ELAPSED DAYS: 23
RAD ONC ARIA PLAN FRACTIONS TREATED TO DATE: 18
RAD ONC ARIA PLAN ID: NORMAL
RAD ONC ARIA PLAN PRESCRIBED DOSE PER FRACTION: 1.8 GY
RAD ONC ARIA PLAN PRIMARY REFERENCE POINT: NORMAL
RAD ONC ARIA PLAN TOTAL FRACTIONS PRESCRIBED: 25
RAD ONC ARIA PLAN TOTAL PRESCRIBED DOSE: 4500 CGY
RAD ONC ARIA REFERENCE POINT DOSAGE GIVEN TO DATE: 32.4 GY
RAD ONC ARIA REFERENCE POINT DOSAGE GIVEN TO DATE: 33.27 GY
RAD ONC ARIA REFERENCE POINT DOSAGE GIVEN TO DATE: 46.8 GY
RAD ONC ARIA REFERENCE POINT DOSAGE GIVEN TO DATE: 46.8 GY
RAD ONC ARIA REFERENCE POINT ID: NORMAL
RAD ONC ARIA REFERENCE POINT SESSION DOSAGE GIVEN: 1.8 GY
RAD ONC ARIA REFERENCE POINT SESSION DOSAGE GIVEN: 1.85 GY
RAD ONC ARIA REFERENCE POINT SESSION DOSAGE GIVEN: 2.6 GY
RAD ONC ARIA REFERENCE POINT SESSION DOSAGE GIVEN: 2.6 GY

## 2024-04-11 ENCOUNTER — HOSPITAL ENCOUNTER (OUTPATIENT)
Facility: HOSPITAL | Age: 74
Discharge: HOME | End: 2024-04-14
Attending: RADIOLOGY

## 2024-04-11 LAB
RAD ONC ARIA COURSE FIRST TREATMENT DATE: NORMAL
RAD ONC ARIA COURSE ID: NORMAL
RAD ONC ARIA COURSE INTENT: NORMAL
RAD ONC ARIA COURSE LAST TREATMENT DATE: NORMAL
RAD ONC ARIA COURSE SESSION NUMBER: 19
RAD ONC ARIA COURSE START DATE: NORMAL
RAD ONC ARIA COURSE TREATMENT ELAPSED DAYS: 24
RAD ONC ARIA PLAN FRACTIONS TREATED TO DATE: 19
RAD ONC ARIA PLAN ID: NORMAL
RAD ONC ARIA PLAN PRESCRIBED DOSE PER FRACTION: 1.8 GY
RAD ONC ARIA PLAN PRIMARY REFERENCE POINT: NORMAL
RAD ONC ARIA PLAN TOTAL FRACTIONS PRESCRIBED: 25
RAD ONC ARIA PLAN TOTAL PRESCRIBED DOSE: 4500 CGY
RAD ONC ARIA REFERENCE POINT DOSAGE GIVEN TO DATE: 34.2 GY
RAD ONC ARIA REFERENCE POINT DOSAGE GIVEN TO DATE: 35.12 GY
RAD ONC ARIA REFERENCE POINT DOSAGE GIVEN TO DATE: 49.4 GY
RAD ONC ARIA REFERENCE POINT DOSAGE GIVEN TO DATE: 49.4 GY
RAD ONC ARIA REFERENCE POINT ID: NORMAL
RAD ONC ARIA REFERENCE POINT SESSION DOSAGE GIVEN: 1.8 GY
RAD ONC ARIA REFERENCE POINT SESSION DOSAGE GIVEN: 1.85 GY
RAD ONC ARIA REFERENCE POINT SESSION DOSAGE GIVEN: 2.6 GY
RAD ONC ARIA REFERENCE POINT SESSION DOSAGE GIVEN: 2.6 GY

## 2024-04-12 ENCOUNTER — HOSPITAL ENCOUNTER (OUTPATIENT)
Facility: HOSPITAL | Age: 74
Discharge: HOME | End: 2024-04-15
Attending: RADIOLOGY

## 2024-04-12 LAB
RAD ONC ARIA COURSE FIRST TREATMENT DATE: NORMAL
RAD ONC ARIA COURSE ID: NORMAL
RAD ONC ARIA COURSE INTENT: NORMAL
RAD ONC ARIA COURSE LAST TREATMENT DATE: NORMAL
RAD ONC ARIA COURSE SESSION NUMBER: 20
RAD ONC ARIA COURSE START DATE: NORMAL
RAD ONC ARIA COURSE TREATMENT ELAPSED DAYS: 25
RAD ONC ARIA PLAN FRACTIONS TREATED TO DATE: 20
RAD ONC ARIA PLAN ID: NORMAL
RAD ONC ARIA PLAN PRESCRIBED DOSE PER FRACTION: 1.8 GY
RAD ONC ARIA PLAN PRIMARY REFERENCE POINT: NORMAL
RAD ONC ARIA PLAN TOTAL FRACTIONS PRESCRIBED: 25
RAD ONC ARIA PLAN TOTAL PRESCRIBED DOSE: 4500 CGY
RAD ONC ARIA REFERENCE POINT DOSAGE GIVEN TO DATE: 36 GY
RAD ONC ARIA REFERENCE POINT DOSAGE GIVEN TO DATE: 36.97 GY
RAD ONC ARIA REFERENCE POINT DOSAGE GIVEN TO DATE: 52 GY
RAD ONC ARIA REFERENCE POINT DOSAGE GIVEN TO DATE: 52 GY
RAD ONC ARIA REFERENCE POINT ID: NORMAL
RAD ONC ARIA REFERENCE POINT SESSION DOSAGE GIVEN: 1.8 GY
RAD ONC ARIA REFERENCE POINT SESSION DOSAGE GIVEN: 1.85 GY
RAD ONC ARIA REFERENCE POINT SESSION DOSAGE GIVEN: 2.6 GY
RAD ONC ARIA REFERENCE POINT SESSION DOSAGE GIVEN: 2.6 GY

## 2024-04-12 ASSESSMENT — PAIN SCALES - GENERAL: PAINLEVEL_OUTOF10: 0

## 2024-04-12 NOTE — PROGRESS NOTES
[facility]   [facility]    Radiation Oncology Weekly Progress Note  Encounter Date: 04/12/24    Rivera Pham  [id number]  1950     Diagnosis   C61, C77.5: Progressive Prostate Cancer with Pelvic Lns:  regional risk prostate cancer, s/p RALP in 2013 - GS 3+4=7, pT2N0, salvage prostate bed RT alone in 2014, pelvic LN SBRT in 2019 at [facility], 2023 had a recurrent right iliac LN on PSMA PET and PSA 0.951 s/p SBRT (refused ADT or elective marilee coverage) to 4000cGy/5fx completed 5/2/23; PSA continuing to increase, now with PSMA+ pelvic Lns x3    AJCC Staging has been reviewed.  Interval History   Mr. Pham is a 73 y.o. male seen today for his weekly on-treatment evaluation    03/22/24 new start this week.  No  or GI complaints.  Has noted hot flashes from ADT  03/29/24: at fraction 10, doing well today, no  issues. Did have a few episodes of diarrhea which he attributes to diet, took one imodium without any further episodes.   4/5/2024: Continues to do well, has not needed any imodium this week.  Had some gas discomfort last night and took Gas-X with good effect.  Has noticed his stamina has decreased, we discussed likely secondary to injury deprivation therapy.  4/12/2024: He needs to do quite well, no significant  or GI changes or complaints.  Needed one half tab of Imodium this past week.  Energy is stable. Having some dizziness when he stands up, not on alpha blocker, encouraged hydration.   Treatment Details:   Treatment Site: Pelvic lymph nodes with gross node boost  Treatment Technique: IMRT/VMAT  Treatment Site Dose/Fx (cGy) #Fx Current Dose (cGy) Total Planned Dose (cGy) Start Date End Date   Pelvic nodes 260/180 20/25 5200/3600 6500/4500 3/18/24 3/22/24     Concurrent Therapy: Concurrent ADT:  Response to treatment: N/A    Allergies and Medications     Allergies   Allergen Reactions    Latex Dermatitis    Acetaminophen Other (See Comments)     Due to liver

## 2024-04-15 ENCOUNTER — HOSPITAL ENCOUNTER (OUTPATIENT)
Facility: HOSPITAL | Age: 74
Discharge: HOME | End: 2024-04-18
Attending: RADIOLOGY

## 2024-04-15 LAB
RAD ONC ARIA COURSE FIRST TREATMENT DATE: NORMAL
RAD ONC ARIA COURSE ID: NORMAL
RAD ONC ARIA COURSE INTENT: NORMAL
RAD ONC ARIA COURSE LAST TREATMENT DATE: NORMAL
RAD ONC ARIA COURSE SESSION NUMBER: 21
RAD ONC ARIA COURSE START DATE: NORMAL
RAD ONC ARIA COURSE TREATMENT ELAPSED DAYS: 28
RAD ONC ARIA PLAN FRACTIONS TREATED TO DATE: 21
RAD ONC ARIA PLAN ID: NORMAL
RAD ONC ARIA PLAN PRESCRIBED DOSE PER FRACTION: 1.8 GY
RAD ONC ARIA PLAN PRIMARY REFERENCE POINT: NORMAL
RAD ONC ARIA PLAN TOTAL FRACTIONS PRESCRIBED: 25
RAD ONC ARIA PLAN TOTAL PRESCRIBED DOSE: 4500 CGY
RAD ONC ARIA REFERENCE POINT DOSAGE GIVEN TO DATE: 37.8 GY
RAD ONC ARIA REFERENCE POINT DOSAGE GIVEN TO DATE: 38.82 GY
RAD ONC ARIA REFERENCE POINT DOSAGE GIVEN TO DATE: 54.6 GY
RAD ONC ARIA REFERENCE POINT DOSAGE GIVEN TO DATE: 54.6 GY
RAD ONC ARIA REFERENCE POINT ID: NORMAL
RAD ONC ARIA REFERENCE POINT SESSION DOSAGE GIVEN: 1.8 GY
RAD ONC ARIA REFERENCE POINT SESSION DOSAGE GIVEN: 1.85 GY
RAD ONC ARIA REFERENCE POINT SESSION DOSAGE GIVEN: 2.6 GY
RAD ONC ARIA REFERENCE POINT SESSION DOSAGE GIVEN: 2.6 GY

## 2024-04-15 RX ORDER — LISINOPRIL 40 MG/1
40 TABLET ORAL DAILY
Qty: 90 TABLET | Refills: 1 | Status: SHIPPED | OUTPATIENT
Start: 2024-04-15

## 2024-04-15 NOTE — TELEPHONE ENCOUNTER
Last office visit 4/09/2024    NEXT APPT  With Internal Medicine (Karlie Membreno MD)  10/10/2024 at 9:40 AM    Last fill on lisinopril 1/21/24 #90 with one additional refill

## 2024-04-16 ENCOUNTER — HOSPITAL ENCOUNTER (OUTPATIENT)
Facility: HOSPITAL | Age: 74
Discharge: HOME | End: 2024-04-19
Attending: RADIOLOGY

## 2024-04-16 LAB
RAD ONC ARIA COURSE FIRST TREATMENT DATE: NORMAL
RAD ONC ARIA COURSE ID: NORMAL
RAD ONC ARIA COURSE INTENT: NORMAL
RAD ONC ARIA COURSE LAST TREATMENT DATE: NORMAL
RAD ONC ARIA COURSE SESSION NUMBER: 22
RAD ONC ARIA COURSE START DATE: NORMAL
RAD ONC ARIA COURSE TREATMENT ELAPSED DAYS: 29
RAD ONC ARIA PLAN FRACTIONS TREATED TO DATE: 22
RAD ONC ARIA PLAN ID: NORMAL
RAD ONC ARIA PLAN PRESCRIBED DOSE PER FRACTION: 1.8 GY
RAD ONC ARIA PLAN PRIMARY REFERENCE POINT: NORMAL
RAD ONC ARIA PLAN TOTAL FRACTIONS PRESCRIBED: 25
RAD ONC ARIA PLAN TOTAL PRESCRIBED DOSE: 4500 CGY
RAD ONC ARIA REFERENCE POINT DOSAGE GIVEN TO DATE: 39.6 GY
RAD ONC ARIA REFERENCE POINT DOSAGE GIVEN TO DATE: 40.66 GY
RAD ONC ARIA REFERENCE POINT DOSAGE GIVEN TO DATE: 57.2 GY
RAD ONC ARIA REFERENCE POINT DOSAGE GIVEN TO DATE: 57.2 GY
RAD ONC ARIA REFERENCE POINT ID: NORMAL
RAD ONC ARIA REFERENCE POINT SESSION DOSAGE GIVEN: 1.8 GY
RAD ONC ARIA REFERENCE POINT SESSION DOSAGE GIVEN: 1.85 GY
RAD ONC ARIA REFERENCE POINT SESSION DOSAGE GIVEN: 2.6 GY
RAD ONC ARIA REFERENCE POINT SESSION DOSAGE GIVEN: 2.6 GY

## 2024-04-17 ENCOUNTER — HOSPITAL ENCOUNTER (OUTPATIENT)
Facility: HOSPITAL | Age: 74
Discharge: HOME | End: 2024-04-20
Attending: RADIOLOGY

## 2024-04-17 LAB
RAD ONC ARIA COURSE FIRST TREATMENT DATE: NORMAL
RAD ONC ARIA COURSE ID: NORMAL
RAD ONC ARIA COURSE INTENT: NORMAL
RAD ONC ARIA COURSE LAST TREATMENT DATE: NORMAL
RAD ONC ARIA COURSE SESSION NUMBER: 23
RAD ONC ARIA COURSE START DATE: NORMAL
RAD ONC ARIA COURSE TREATMENT ELAPSED DAYS: 30
RAD ONC ARIA PLAN FRACTIONS TREATED TO DATE: 23
RAD ONC ARIA PLAN ID: NORMAL
RAD ONC ARIA PLAN PRESCRIBED DOSE PER FRACTION: 1.8 GY
RAD ONC ARIA PLAN PRIMARY REFERENCE POINT: NORMAL
RAD ONC ARIA PLAN TOTAL FRACTIONS PRESCRIBED: 25
RAD ONC ARIA PLAN TOTAL PRESCRIBED DOSE: 4500 CGY
RAD ONC ARIA REFERENCE POINT DOSAGE GIVEN TO DATE: 41.4 GY
RAD ONC ARIA REFERENCE POINT DOSAGE GIVEN TO DATE: 42.51 GY
RAD ONC ARIA REFERENCE POINT DOSAGE GIVEN TO DATE: 59.8 GY
RAD ONC ARIA REFERENCE POINT DOSAGE GIVEN TO DATE: 59.8 GY
RAD ONC ARIA REFERENCE POINT ID: NORMAL
RAD ONC ARIA REFERENCE POINT SESSION DOSAGE GIVEN: 1.8 GY
RAD ONC ARIA REFERENCE POINT SESSION DOSAGE GIVEN: 1.85 GY
RAD ONC ARIA REFERENCE POINT SESSION DOSAGE GIVEN: 2.6 GY
RAD ONC ARIA REFERENCE POINT SESSION DOSAGE GIVEN: 2.6 GY

## 2024-04-18 ENCOUNTER — HOSPITAL ENCOUNTER (OUTPATIENT)
Facility: HOSPITAL | Age: 74
Discharge: HOME | End: 2024-04-21
Attending: RADIOLOGY

## 2024-04-18 LAB
RAD ONC ARIA COURSE FIRST TREATMENT DATE: NORMAL
RAD ONC ARIA COURSE ID: NORMAL
RAD ONC ARIA COURSE INTENT: NORMAL
RAD ONC ARIA COURSE LAST TREATMENT DATE: NORMAL
RAD ONC ARIA COURSE SESSION NUMBER: 24
RAD ONC ARIA COURSE START DATE: NORMAL
RAD ONC ARIA COURSE TREATMENT ELAPSED DAYS: 31
RAD ONC ARIA PLAN FRACTIONS TREATED TO DATE: 24
RAD ONC ARIA PLAN ID: NORMAL
RAD ONC ARIA PLAN PRESCRIBED DOSE PER FRACTION: 1.8 GY
RAD ONC ARIA PLAN PRIMARY REFERENCE POINT: NORMAL
RAD ONC ARIA PLAN TOTAL FRACTIONS PRESCRIBED: 25
RAD ONC ARIA PLAN TOTAL PRESCRIBED DOSE: 4500 CGY
RAD ONC ARIA REFERENCE POINT DOSAGE GIVEN TO DATE: 43.2 GY
RAD ONC ARIA REFERENCE POINT DOSAGE GIVEN TO DATE: 44.36 GY
RAD ONC ARIA REFERENCE POINT DOSAGE GIVEN TO DATE: 62.4 GY
RAD ONC ARIA REFERENCE POINT DOSAGE GIVEN TO DATE: 62.4 GY
RAD ONC ARIA REFERENCE POINT ID: NORMAL
RAD ONC ARIA REFERENCE POINT SESSION DOSAGE GIVEN: 1.8 GY
RAD ONC ARIA REFERENCE POINT SESSION DOSAGE GIVEN: 1.85 GY
RAD ONC ARIA REFERENCE POINT SESSION DOSAGE GIVEN: 2.6 GY
RAD ONC ARIA REFERENCE POINT SESSION DOSAGE GIVEN: 2.6 GY

## 2024-04-19 ENCOUNTER — HOSPITAL ENCOUNTER (OUTPATIENT)
Facility: HOSPITAL | Age: 74
Discharge: HOME | End: 2024-04-22
Attending: RADIOLOGY

## 2024-04-19 LAB
RAD ONC ARIA COURSE FIRST TREATMENT DATE: NORMAL
RAD ONC ARIA COURSE ID: NORMAL
RAD ONC ARIA COURSE INTENT: NORMAL
RAD ONC ARIA COURSE LAST TREATMENT DATE: NORMAL
RAD ONC ARIA COURSE SESSION NUMBER: 25
RAD ONC ARIA COURSE START DATE: NORMAL
RAD ONC ARIA COURSE TREATMENT ELAPSED DAYS: 32
RAD ONC ARIA PLAN FRACTIONS TREATED TO DATE: 25
RAD ONC ARIA PLAN ID: NORMAL
RAD ONC ARIA PLAN PRESCRIBED DOSE PER FRACTION: 1.8 GY
RAD ONC ARIA PLAN PRIMARY REFERENCE POINT: NORMAL
RAD ONC ARIA PLAN TOTAL FRACTIONS PRESCRIBED: 25
RAD ONC ARIA PLAN TOTAL PRESCRIBED DOSE: 4500 CGY
RAD ONC ARIA REFERENCE POINT DOSAGE GIVEN TO DATE: 45 GY
RAD ONC ARIA REFERENCE POINT DOSAGE GIVEN TO DATE: 46.21 GY
RAD ONC ARIA REFERENCE POINT DOSAGE GIVEN TO DATE: 65 GY
RAD ONC ARIA REFERENCE POINT DOSAGE GIVEN TO DATE: 65 GY
RAD ONC ARIA REFERENCE POINT ID: NORMAL
RAD ONC ARIA REFERENCE POINT SESSION DOSAGE GIVEN: 1.8 GY
RAD ONC ARIA REFERENCE POINT SESSION DOSAGE GIVEN: 1.85 GY
RAD ONC ARIA REFERENCE POINT SESSION DOSAGE GIVEN: 2.6 GY
RAD ONC ARIA REFERENCE POINT SESSION DOSAGE GIVEN: 2.6 GY

## 2024-04-19 ASSESSMENT — PAIN SCALES - GENERAL: PAINLEVEL_OUTOF10: 0

## 2024-04-19 NOTE — PROGRESS NOTES
[facility]   [facility]    Radiation Oncology Weekly Progress Note  Encounter Date: 04/19/24    Rivera Pham  [id number]  1950     Diagnosis   C61, C77.5: Progressive Prostate Cancer with Pelvic Lns:  regional risk prostate cancer, s/p RALP in 2013 - GS 3+4=7, pT2N0, salvage prostate bed RT alone in 2014, pelvic LN SBRT in 2019 at [facility], 2023 had a recurrent right iliac LN on PSMA PET and PSA 0.951 s/p SBRT (refused ADT or elective marilee coverage) to 4000cGy/5fx completed 5/2/23; PSA continuing to increase, now with PSMA+ pelvic Lns x3    AJCC Staging has been reviewed.  Interval History   Mr. Pham is a 73 y.o. male seen today for his weekly on-treatment evaluation    03/22/24 new start this week.  No  or GI complaints.  Has noted hot flashes from ADT  03/29/24: at fraction 10, doing well today, no  issues. Did have a few episodes of diarrhea which he attributes to diet, took one imodium without any further episodes.   4/5/2024: Continues to do well, has not needed any imodium this week.  Had some gas discomfort last night and took Gas-X with good effect.  Has noticed his stamina has decreased, we discussed likely secondary to injury deprivation therapy.  4/12/2024: He needs to do quite well, no significant  or GI changes or complaints.  Needed one half tab of Imodium this past week.  Energy is stable. Having some dizziness when he stands up, not on alpha blocker, encouraged hydration.   4/19/2024: Continues to do extremely well, no GI complaints or changes.  Energy slightly down.  He will see Dr. Echavarria in a couple weeks, I will see him back 3 months after this with a PSA and testosterone.  He will continue Orgovyx for now.    Treatment Details:   Treatment Site: Pelvic lymph nodes with gross node boost  Treatment Technique: IMRT/VMAT  Treatment Site Dose/Fx (cGy) #Fx Current Dose (cGy) Total Planned Dose (cGy) Start Date End Date   Pelvic nodes 260/180 25/25 6500/4500

## 2024-05-20 ENCOUNTER — OFFICE VISIT (OUTPATIENT)
Age: 74
End: 2024-05-20
Payer: MEDICARE

## 2024-05-20 VITALS
HEIGHT: 71 IN | SYSTOLIC BLOOD PRESSURE: 132 MMHG | RESPIRATION RATE: 16 BRPM | TEMPERATURE: 97 F | HEART RATE: 58 BPM | WEIGHT: 186.8 LBS | DIASTOLIC BLOOD PRESSURE: 70 MMHG | OXYGEN SATURATION: 100 % | BODY MASS INDEX: 26.15 KG/M2

## 2024-05-20 DIAGNOSIS — H93.12 TINNITUS, LEFT: Primary | ICD-10-CM

## 2024-05-20 PROCEDURE — 99213 OFFICE O/P EST LOW 20 MIN: CPT | Performed by: INTERNAL MEDICINE

## 2024-05-20 PROCEDURE — G8419 CALC BMI OUT NRM PARAM NOF/U: HCPCS | Performed by: INTERNAL MEDICINE

## 2024-05-20 PROCEDURE — G8427 DOCREV CUR MEDS BY ELIG CLIN: HCPCS | Performed by: INTERNAL MEDICINE

## 2024-05-20 PROCEDURE — 1036F TOBACCO NON-USER: CPT | Performed by: INTERNAL MEDICINE

## 2024-05-20 PROCEDURE — 1123F ACP DISCUSS/DSCN MKR DOCD: CPT | Performed by: INTERNAL MEDICINE

## 2024-05-20 PROCEDURE — 3017F COLORECTAL CA SCREEN DOC REV: CPT | Performed by: INTERNAL MEDICINE

## 2024-05-20 ASSESSMENT — PATIENT HEALTH QUESTIONNAIRE - PHQ9
SUM OF ALL RESPONSES TO PHQ9 QUESTIONS 1 & 2: 0
SUM OF ALL RESPONSES TO PHQ QUESTIONS 1-9: 0
2. FEELING DOWN, DEPRESSED OR HOPELESS: NOT AT ALL
1. LITTLE INTEREST OR PLEASURE IN DOING THINGS: NOT AT ALL
SUM OF ALL RESPONSES TO PHQ QUESTIONS 1-9: 0

## 2024-05-20 NOTE — PROGRESS NOTES
Rivera Pham is a 73 y.o. male who was seen in clinic today (5/20/2024) for an acute visit.      Assessment & Plan:   Below is the assessment and plan developed based on review of pertinent history, physical exam, labs, studies, and medications.    1. Tinnitus, left  Comments:  new dx, unclear etiology, possible some TM swelling. Will start w/ Flonase & saline rinses. If no changes will need imaging. Red flags & expectations reviewed     Return if symptoms worsen or fail to improve.   Subjective/Objective:   Rivera was seen today for Otalgia    URI Review  Rivera returns to clinic today to talk about: left ear pain that started 4-5 days and is unchanged since that time.  He reports at night he can hear his heart beat in his ear.  He has chronic tinnitus and this different.  He denies any hearing loss or pain.  He denies a history of: fever, headache, sinus congestion, post nasal drip, and rhinorrhea.  Treatments have included: nothing.  Home BP is in the 110's/60's.  No obvious etiology.  Patient reports sick contacts: no.       He has a brother in law who had carotid artery stenosis, underwent CEA, and had a stroke on the table due to plaque rupture.        Prior to Admission medications    Medication Sig Start Date End Date Taking? Authorizing Provider   lisinopril (PRINIVIL;ZESTRIL) 40 MG tablet Take 1 tablet by mouth daily 4/15/24  Yes Karlie Membreno MD   ORGOVYX 120 MG TABS  2/5/24  Yes Maxx Haley MD   atorvastatin (LIPITOR) 40 MG tablet TAKE 1 TABLET BY MOUTH EVERY DAY AT NIGHT 2/20/24  Yes Karlie Membreno MD   cetirizine (ZYRTEC) 10 MG tablet Take by mouth daily as needed   Yes Automatic Reconciliation, Ar   diclofenac (VOLTAREN) 50 MG EC tablet Take 1 tablet by mouth 2 times daily as needed 7/10/22  Yes Automatic Reconciliation, Ar   Multiple Vitamins-Minerals (MULTIVITAMIN ADULT EXTRA C PO) Take 1 tablet by mouth   Yes ProviderMaxx MD   aspirin 81 MG EC tablet Take by mouth daily   Yes

## 2024-07-07 ENCOUNTER — OFFICE VISIT (OUTPATIENT)
Age: 74
End: 2024-07-07

## 2024-07-07 VITALS
WEIGHT: 184.4 LBS | HEART RATE: 70 BPM | TEMPERATURE: 98 F | OXYGEN SATURATION: 95 % | RESPIRATION RATE: 18 BRPM | BODY MASS INDEX: 25.81 KG/M2 | HEIGHT: 71 IN | DIASTOLIC BLOOD PRESSURE: 76 MMHG | SYSTOLIC BLOOD PRESSURE: 122 MMHG

## 2024-07-07 DIAGNOSIS — R30.9 PAINFUL URINATION: ICD-10-CM

## 2024-07-07 DIAGNOSIS — B36.9 FUNGAL DERMATITIS: Primary | ICD-10-CM

## 2024-07-07 LAB
BILIRUBIN, URINE, POC: NEGATIVE
BLOOD URINE, POC: ABNORMAL
GLUCOSE URINE, POC: NEGATIVE
KETONES, URINE, POC: NEGATIVE
LEUKOCYTE ESTERASE, URINE, POC: NEGATIVE
NITRITE, URINE, POC: NEGATIVE
PH, URINE, POC: 5.5 (ref 4.6–8)
PROTEIN,URINE, POC: NEGATIVE
SPECIFIC GRAVITY, URINE, POC: 1.02 (ref 1–1.03)
URINALYSIS CLARITY, POC: CLEAR
URINALYSIS COLOR, POC: YELLOW
UROBILINOGEN, POC: ABNORMAL

## 2024-07-07 RX ORDER — CLOTRIMAZOLE 1 %
CREAM (GRAM) TOPICAL
Qty: 60 G | Refills: 0 | Status: SHIPPED | OUTPATIENT
Start: 2024-07-07 | End: 2024-07-14

## 2024-07-08 LAB
BACTERIA SPEC CULT: NORMAL
SERVICE CMNT-IMP: NORMAL

## 2024-07-30 DIAGNOSIS — C61 MALIGNANT NEOPLASM OF PROSTATE (HCC): Primary | ICD-10-CM

## 2024-08-14 DIAGNOSIS — E78.2 MIXED HYPERLIPIDEMIA: ICD-10-CM

## 2024-08-14 RX ORDER — ATORVASTATIN CALCIUM 40 MG/1
40 TABLET, FILM COATED ORAL NIGHTLY
Qty: 90 TABLET | Refills: 0 | Status: SHIPPED | OUTPATIENT
Start: 2024-08-14

## 2024-08-14 NOTE — TELEPHONE ENCOUNTER
Rx sent to pharmacy as previously filled and verified by Verbal Order Read Back with provider.    NV 08/21/2024

## 2024-08-16 DIAGNOSIS — C61 MALIGNANT NEOPLASM OF PROSTATE (HCC): ICD-10-CM

## 2024-08-18 LAB — PSA SERPL DL<=0.01 NG/ML-MCNC: <0.006 NG/ML (ref 0–4)

## 2024-08-21 ENCOUNTER — HOSPITAL ENCOUNTER (OUTPATIENT)
Facility: HOSPITAL | Age: 74
Discharge: HOME | End: 2024-08-24
Attending: RADIOLOGY

## 2024-08-21 VITALS
WEIGHT: 184 LBS | SYSTOLIC BLOOD PRESSURE: 134 MMHG | DIASTOLIC BLOOD PRESSURE: 72 MMHG | HEART RATE: 68 BPM | BODY MASS INDEX: 25.76 KG/M2 | HEIGHT: 71 IN

## 2024-08-21 DIAGNOSIS — C61 MALIGNANT NEOPLASM OF PROSTATE (HCC): ICD-10-CM

## 2024-08-21 DIAGNOSIS — C77.5 SECONDARY AND UNSPECIFIED MALIGNANT NEOPLASM OF INTRAPELVIC LYMPH NODES (HCC): Primary | ICD-10-CM

## 2024-08-21 RX ORDER — MIRABEGRON 50 MG/1
50 TABLET, FILM COATED, EXTENDED RELEASE ORAL DAILY
COMMUNITY
Start: 2024-07-24

## 2024-08-21 ASSESSMENT — PAIN SCALES - GENERAL: PAINLEVEL_OUTOF10: 0

## 2024-08-21 NOTE — PROGRESS NOTES
with PSMA+ pelvic Lns x3. He is now on Orgovyx starting in 3/2024 and s/p EBRT to pelvic LNs to 6500cGy/25fx to PSMA+ LNs and 4500cGy to elective pelvic nodes completed 4/19/24. Here for follow up.       We reviewed his PSA which is appropriately undetectable.  We discussed this could be either due to radiation or Orgovyx or both but at this time we will continue Orgovyx and continue with PSA checkups.  We have discussed continuing Orgovyx for a total of 2 years but will reassess his tolerance on a regular basis.    He will continue on Myrbetriq with Dr. Echavarria.  I recommended a referral to pelvic floor physical therapy which he is in agreement with.  His most recent radiation treatment did not include volumes around the bladder or prostate bed so is hard to know the etiology of his worsening urinary incontinence.  ADT could be leading to weakened muscle strength in the pelvic floor.    I will see him in 6 months with a PSA and testosterone.  He will see Dr. Echavarria  in the interim.    Thank you kindly for the opportunity to participate in the care of your patient, Mr. Pham.  He knows to reach out anytime in the interim as needed.    Plan:  - PSA undetectable  - Continue orgovyx x 2 years  - Continue myrbetriq   - See Dr. Echavarria as planned  - RTC in 6 months with PSA/T  - Referral placed to pelvic floor PT    Sandeep Clifford MD  [facility]  [facility]  [address], [address], [place] [address]  P: [phone]  [facility] [address], [place] [address]  P: [phone]  [facility]  [address] [id number]  P: [phone]      Time and Counseling: I spent a total of 20 minutes on the care of this patient on 8/21/24.    ICD Code:    ICD-10-CM    1. 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 Malignant

## 2024-10-07 SDOH — ECONOMIC STABILITY: FOOD INSECURITY: WITHIN THE PAST 12 MONTHS, THE FOOD YOU BOUGHT JUST DIDN'T LAST AND YOU DIDN'T HAVE MONEY TO GET MORE.: NEVER TRUE

## 2024-10-07 SDOH — ECONOMIC STABILITY: INCOME INSECURITY: HOW HARD IS IT FOR YOU TO PAY FOR THE VERY BASICS LIKE FOOD, HOUSING, MEDICAL CARE, AND HEATING?: NOT HARD AT ALL

## 2024-10-07 SDOH — ECONOMIC STABILITY: FOOD INSECURITY: WITHIN THE PAST 12 MONTHS, YOU WORRIED THAT YOUR FOOD WOULD RUN OUT BEFORE YOU GOT MONEY TO BUY MORE.: NEVER TRUE

## 2024-10-10 ENCOUNTER — OFFICE VISIT (OUTPATIENT)
Age: 74
End: 2024-10-10
Payer: MEDICARE

## 2024-10-10 VITALS
RESPIRATION RATE: 16 BRPM | HEART RATE: 60 BPM | DIASTOLIC BLOOD PRESSURE: 68 MMHG | TEMPERATURE: 97.8 F | BODY MASS INDEX: 26.18 KG/M2 | SYSTOLIC BLOOD PRESSURE: 117 MMHG | OXYGEN SATURATION: 97 % | HEIGHT: 71 IN | WEIGHT: 187 LBS

## 2024-10-10 DIAGNOSIS — E78.2 MIXED HYPERLIPIDEMIA: ICD-10-CM

## 2024-10-10 DIAGNOSIS — C61 MALIGNANT NEOPLASM OF PROSTATE (HCC): ICD-10-CM

## 2024-10-10 DIAGNOSIS — Z79.899 ENCOUNTER FOR LONG-TERM (CURRENT) USE OF MEDICATIONS: ICD-10-CM

## 2024-10-10 DIAGNOSIS — I10 ESSENTIAL (PRIMARY) HYPERTENSION: Primary | ICD-10-CM

## 2024-10-10 DIAGNOSIS — R73.01 IMPAIRED FASTING GLUCOSE: ICD-10-CM

## 2024-10-10 PROCEDURE — 1123F ACP DISCUSS/DSCN MKR DOCD: CPT | Performed by: INTERNAL MEDICINE

## 2024-10-10 PROCEDURE — 3017F COLORECTAL CA SCREEN DOC REV: CPT | Performed by: INTERNAL MEDICINE

## 2024-10-10 PROCEDURE — 3074F SYST BP LT 130 MM HG: CPT | Performed by: INTERNAL MEDICINE

## 2024-10-10 PROCEDURE — 3078F DIAST BP <80 MM HG: CPT | Performed by: INTERNAL MEDICINE

## 2024-10-10 PROCEDURE — G8484 FLU IMMUNIZE NO ADMIN: HCPCS | Performed by: INTERNAL MEDICINE

## 2024-10-10 PROCEDURE — 99214 OFFICE O/P EST MOD 30 MIN: CPT | Performed by: INTERNAL MEDICINE

## 2024-10-10 PROCEDURE — G8427 DOCREV CUR MEDS BY ELIG CLIN: HCPCS | Performed by: INTERNAL MEDICINE

## 2024-10-10 PROCEDURE — G8419 CALC BMI OUT NRM PARAM NOF/U: HCPCS | Performed by: INTERNAL MEDICINE

## 2024-10-10 PROCEDURE — 1036F TOBACCO NON-USER: CPT | Performed by: INTERNAL MEDICINE

## 2024-10-10 RX ORDER — LISINOPRIL 40 MG/1
40 TABLET ORAL DAILY
Qty: 90 TABLET | Refills: 1 | Status: SHIPPED | OUTPATIENT
Start: 2024-10-10

## 2024-10-10 RX ORDER — ATORVASTATIN CALCIUM 40 MG/1
40 TABLET, FILM COATED ORAL NIGHTLY
Qty: 90 TABLET | Refills: 1 | Status: SHIPPED | OUTPATIENT
Start: 2024-10-10

## 2024-10-10 NOTE — PROGRESS NOTES
Verified name and birth date for privacy precautions.   Chart reviewed in preparation for today's visit.     Chief Complaint   Patient presents with    Hypertension          Health Maintenance Due   Topic    Flu vaccine (1)         Wt Readings from Last 3 Encounters:   10/10/24 84.8 kg (187 lb)   08/21/24 83.5 kg (184 lb)   07/07/24 83.6 kg (184 lb 6.4 oz)     Temp Readings from Last 3 Encounters:   10/10/24 97.8 °F (36.6 °C)   07/07/24 98 °F (36.7 °C) (Temporal)   05/20/24 97 °F (36.1 °C) (Temporal)     BP Readings from Last 3 Encounters:   10/10/24 117/68   08/21/24 134/72   07/07/24 122/76     Pulse Readings from Last 3 Encounters:   10/10/24 60   08/21/24 68   07/07/24 70       Social Determinants of Health     Tobacco Use: Medium Risk (10/10/2024)    Patient History     Smoking Tobacco Use: Former     Smokeless Tobacco Use: Never     Passive Exposure: Past   Alcohol Use: Not At Risk (4/6/2024)    AUDIT-C     Frequency of Alcohol Consumption: Never     Average Number of Drinks: Patient does not drink     Frequency of Binge Drinking: Never   Financial Resource Strain: Low Risk  (10/7/2024)    Overall Financial Resource Strain (CARDIA)     Difficulty of Paying Living Expenses: Not hard at all   Food Insecurity: No Food Insecurity (10/7/2024)    Hunger Vital Sign     Worried About Running Out of Food in the Last Year: Never true     Ran Out of Food in the Last Year: Never true   Transportation Needs: Unknown (10/7/2024)    PRAPARE - Transportation     Lack of Transportation (Medical): Not on file     Lack of Transportation (Non-Medical): No   Physical Activity: Sufficiently Active (4/6/2024)    Exercise Vital Sign     Days of Exercise per Week: 7 days     Minutes of Exercise per Session: 60 min   Stress: Not on file   Social Connections: Not on file   Intimate Partner Violence: Not on file   Depression: Not at risk (5/20/2024)    PHQ-2     PHQ-2 Score: 0   Housing Stability: Unknown (10/7/2024)    Housing Stability 
Initially, he was prescribed Myrbetriq 25 mg, but after three weeks without significant improvement, the dosage was increased to 50 mg. He does not believe the medication is effective. He consumes one cup of coffee in the morning and a mug of tea in the afternoon but has recently reduced his coffee intake by diluting it with hot water. He reports experiencing leakage without being aware of it.    He has been participating in a study at [facility], which he believes has concluded. He underwent blood work as part of this study. His Prostate-Specific Antigen (PSA) levels were found to be less than 0.01, a result he attributes to hormone therapy. He reports intermittent fatigue and hot flashes. His appetite remains good.    Additionally, he sprained his ankle and tore ligaments, causing him to hobble for quite a while since 05/2024.       Patient Care Team:  -Dr. CASEY Haas, derm  -Dr. Sarah Zarate, urology, [facility].   -Dr. Javy Echavarria, urology  -[facility]  -Dr. YARY Torres, GI  -Dr. ARIEL Clifford, radiation onc.       Objective:     Wt Readings from Last 3 Encounters:   10/10/24 84.8 kg (187 lb)   08/21/24 83.5 kg (184 lb)   07/07/24 83.6 kg (184 lb 6.4 oz)     BP Readings from Last 3 Encounters:   10/10/24 117/68   08/21/24 134/72   07/07/24 122/76     /68   Pulse 60   Temp 97.8 °F (36.6 °C)   Resp 16   Ht 1.803 m (5' 11\")   Wt 84.8 kg (187 lb)   SpO2 97%   BMI 26.08 kg/m²   General appearance: alert, appears stated age, and cooperative  Head: Normocephalic, without obvious abnormality, atraumatic  Neck: no adenopathy, no carotid bruit, no JVD, and supple, symmetrical, trachea midline  Lungs: clear to auscultation bilaterally  Heart: regular rate and rhythm, S1, S2 normal, no murmur, click, rub or gallop  Abdomen: soft, non-tender; bowel sounds normal; no masses,  no organomegaly  Extremities: extremities normal, atraumatic, no cyanosis or edema  Pulses: 2+ and symmetric            The patient (or

## 2024-12-17 ENCOUNTER — OFFICE VISIT (OUTPATIENT)
Age: 74
End: 2024-12-17
Payer: MEDICARE

## 2024-12-17 VITALS
HEIGHT: 71 IN | OXYGEN SATURATION: 95 % | DIASTOLIC BLOOD PRESSURE: 69 MMHG | TEMPERATURE: 98 F | BODY MASS INDEX: 26.4 KG/M2 | WEIGHT: 188.6 LBS | RESPIRATION RATE: 16 BRPM | SYSTOLIC BLOOD PRESSURE: 117 MMHG | HEART RATE: 71 BPM

## 2024-12-17 DIAGNOSIS — G89.29 CHRONIC LOW BACK PAIN WITHOUT SCIATICA, UNSPECIFIED BACK PAIN LATERALITY: Primary | ICD-10-CM

## 2024-12-17 DIAGNOSIS — M54.50 CHRONIC LOW BACK PAIN WITHOUT SCIATICA, UNSPECIFIED BACK PAIN LATERALITY: Primary | ICD-10-CM

## 2024-12-17 PROCEDURE — 97814 ACUP 1/> W/ESTIM EA ADDL 15: CPT | Performed by: INTERNAL MEDICINE

## 2024-12-17 PROCEDURE — 99215 OFFICE O/P EST HI 40 MIN: CPT | Performed by: INTERNAL MEDICINE

## 2024-12-17 PROCEDURE — 97813 ACUP 1/> W/ESTIM 1ST 15 MIN: CPT | Performed by: INTERNAL MEDICINE

## 2024-12-17 RX ORDER — METHOCARBAMOL 750 MG/1
750 TABLET, FILM COATED ORAL 3 TIMES DAILY PRN
Qty: 30 TABLET | Refills: 0 | Status: SHIPPED | OUTPATIENT
Start: 2024-12-17 | End: 2024-12-27

## 2024-12-17 NOTE — PROGRESS NOTES
Rivera Pham is a 74 y.o. male Established patient who presents today for evaluation of the following -           Assessment & Plan  1. Chronic back pain with acute flare-up  - Pain exacerbated by physical therapy exercises involving an 8-pound weight and chopping motion  - Using methocarbamol, Advil, heating pad, and ice for relief  - Muscle knot about the size of a golf ball noted  - Advised to use ice for 20 minutes followed by heat for 20 minutes and avoid strenuous activities  - Continue pelvic floor exercises at home  - Acupuncture performed today to release muscle tension  - Prescription for methocarbamol sent to [facility] on [address]  - Further evaluation in January 2025 if symptoms persist    PROCEDURE  Acupuncture performed to release muscle tension.         Assessment and Plan:   Diagnosis Orders   1. Chronic low back pain without sciatica, unspecified back pain laterality            --Discussed with patient about risk and benefit of acupuncture. Patient agreed to proceed with acupunture treatment today.  --Consent discussed and obtained from patient.  Consent form and ABN forms completed.  -completed treatment without any complications.   -continue daily stretches  -may continue use of methocarbamol tid prn for muscle spasms.   -recommended ice compresses for 20 minutes followed by heat for 20 minutes.     Orders Placed This Encounter    ACUPUNCT W/ ELEC STIMUL 15 MIN    ACUPUNCT W/ ELEC STIMUL ADDL 15M    methocarbamol (ROBAXIN-750) 750 MG tablet     Sig: Take 1 tablet by mouth 3 times daily as needed (back pain)     Dispense:  30 tablet     Refill:  0          On this 12/18/2024 I have spent 50 minutes reviewing previous notes, test results and face to face with the patient discussing the diagnosis and importance of compliance with the treatment plan as well as documenting on the day of the visit. (30 minutes spent in procedure)     The patient (or guardian, if applicable) and other individuals in

## 2024-12-31 ENCOUNTER — PATIENT MESSAGE (OUTPATIENT)
Age: 74
End: 2024-12-31

## 2024-12-31 RX ORDER — METHYLPREDNISOLONE 4 MG/1
TABLET ORAL
Qty: 1 KIT | Refills: 0 | Status: SHIPPED | OUTPATIENT
Start: 2024-12-31 | End: 2025-01-06

## 2025-01-06 ASSESSMENT — ENCOUNTER SYMPTOMS
ABDOMINAL PAIN: 0
NAUSEA: 0

## 2025-02-18 DIAGNOSIS — C77.5 SECONDARY AND UNSPECIFIED MALIGNANT NEOPLASM OF INTRAPELVIC LYMPH NODES (HCC): ICD-10-CM

## 2025-02-18 DIAGNOSIS — C61 MALIGNANT NEOPLASM OF PROSTATE (HCC): ICD-10-CM

## 2025-02-20 LAB
PSA SERPL DL<=0.01 NG/ML-MCNC: <0.006 NG/ML (ref 0–4)
TESTOST SERPL-MCNC: <3 NG/DL (ref 264–916)

## 2025-02-21 ENCOUNTER — HOSPITAL ENCOUNTER (OUTPATIENT)
Facility: HOSPITAL | Age: 75
Discharge: HOME | End: 2025-02-24
Attending: RADIOLOGY

## 2025-02-21 VITALS
HEIGHT: 71 IN | SYSTOLIC BLOOD PRESSURE: 134 MMHG | WEIGHT: 185 LBS | HEART RATE: 75 BPM | BODY MASS INDEX: 25.9 KG/M2 | DIASTOLIC BLOOD PRESSURE: 63 MMHG

## 2025-02-21 DIAGNOSIS — C61 PROSTATE CANCER (HCC): ICD-10-CM

## 2025-02-21 DIAGNOSIS — C77.5 SECONDARY AND UNSPECIFIED MALIGNANT NEOPLASM OF INTRAPELVIC LYMPH NODES (HCC): Primary | ICD-10-CM

## 2025-02-21 ASSESSMENT — PAIN SCALES - GENERAL: PAINLEVEL_OUTOF10: 0

## 2025-02-21 NOTE — PROGRESS NOTES
when you urinated?  1 - Less than 1 time in 5   4) How difficult have you found it to postpone urination?  1 - Less than 1 time in 5   5) How often have you had a weak urinary stream?  3 - About half the time   6) How often have you had to push or strain to begin urination?  0 - Not at all   7) How many times did you most typically get up to urinate from the time you went to bed until the time you got up in the morning?  1 - 1 time   Total Score:  9     Urinary QOL: 5 - unhappy - due to leakage    Sexual Health Inventory for Men: 1 / 25  17-21 Mild ED  12-16 Mild to Moderate ED  8-11 Moderate ED  1-7 Severe ED    Review of Systems:  A complete review of systems was obtained and negative except as described above.    Interval Imaging/Labs   PSA:  9/2/2020: 0.214  12/8/2020: 0.248  3/29/2021: 0.349  9/8/2021: 0.397  3/14/2022: 0.438  9/18/2022: 0.749  1/3/2023: 0.915  3/21/2023: 1.09  --SBRT to right external iliac lymph node--  8/22/2023: 1.34  11/22/2023: 2.06  3/2024: Started Orgovyx  4/2024: Pelvic marilee EBRT  8/2024: <0.006  11/12/24: <0.014  2/20/25: <0.006 (T <3)    Above imaging/lab reports were reviewed.  Allergies and Medications     Allergies   Allergen Reactions    Latex Dermatitis    Acetaminophen Other (See Comments)     Due to liver disease  Other reaction(s): Unknown (comments)  Does not take because of liver issue  Does not take because of liver issue    Amlodipine Swelling     ankles       Current Outpatient Medications   Medication Instructions    aspirin 81 MG EC tablet Oral, DAILY    atorvastatin (LIPITOR) 40 mg, Oral, NIGHTLY    cetirizine (ZYRTEC) 10 MG tablet Oral, DAILY PRN    diclofenac (VOLTAREN) 50 mg, Oral, 2 TIMES DAILY PRN    fluticasone (FLONASE) 50 MCG/ACT nasal spray 2 sprays, Nasal, DAILY PRN    lisinopril (PRINIVIL;ZESTRIL) 40 mg, Oral, DAILY    Multiple Vitamins-Minerals (MULTIVITAMIN ADULT EXTRA C PO) 1 tablet, Oral    Myrbetriq 50 mg, DAILY    ORGOVYX 120 MG TABS Take 1 tablet by

## 2025-03-07 ENCOUNTER — TELEPHONE (OUTPATIENT)
Age: 75
End: 2025-03-07

## 2025-03-07 ENCOUNTER — HOSPITAL ENCOUNTER (EMERGENCY)
Facility: HOSPITAL | Age: 75
Discharge: HOME | End: 2025-03-07
Attending: EMERGENCY MEDICINE
Payer: MEDICARE

## 2025-03-07 ENCOUNTER — APPOINTMENT (OUTPATIENT)
Facility: HOSPITAL | Age: 75
End: 2025-03-07
Payer: MEDICARE

## 2025-03-07 ENCOUNTER — OFFICE VISIT (OUTPATIENT)
Age: 75
End: 2025-03-07

## 2025-03-07 VITALS
WEIGHT: 190.04 LBS | TEMPERATURE: 98.2 F | RESPIRATION RATE: 16 BRPM | OXYGEN SATURATION: 97 % | SYSTOLIC BLOOD PRESSURE: 135 MMHG | HEIGHT: 71 IN | HEART RATE: 59 BPM | DIASTOLIC BLOOD PRESSURE: 71 MMHG | BODY MASS INDEX: 26.6 KG/M2

## 2025-03-07 VITALS
TEMPERATURE: 98.9 F | HEART RATE: 75 BPM | BODY MASS INDEX: 26.78 KG/M2 | DIASTOLIC BLOOD PRESSURE: 80 MMHG | RESPIRATION RATE: 18 BRPM | OXYGEN SATURATION: 96 % | SYSTOLIC BLOOD PRESSURE: 142 MMHG | WEIGHT: 192 LBS

## 2025-03-07 DIAGNOSIS — S01.81XA FACIAL LACERATION, INITIAL ENCOUNTER: Primary | ICD-10-CM

## 2025-03-07 DIAGNOSIS — S02.2XXA CLOSED FRACTURE OF NASAL BONE, INITIAL ENCOUNTER: Primary | ICD-10-CM

## 2025-03-07 DIAGNOSIS — S09.90XA INJURY OF HEAD, INITIAL ENCOUNTER: ICD-10-CM

## 2025-03-07 DIAGNOSIS — S09.92XA INJURY OF NOSE, INITIAL ENCOUNTER: ICD-10-CM

## 2025-03-07 PROCEDURE — 70450 CT HEAD/BRAIN W/O DYE: CPT

## 2025-03-07 PROCEDURE — 99284 EMERGENCY DEPT VISIT MOD MDM: CPT

## 2025-03-07 PROCEDURE — 70486 CT MAXILLOFACIAL W/O DYE: CPT

## 2025-03-07 RX ORDER — CEPHALEXIN 500 MG/1
500 CAPSULE ORAL 3 TIMES DAILY
Qty: 21 CAPSULE | Refills: 0 | Status: SHIPPED | OUTPATIENT
Start: 2025-03-07 | End: 2025-03-14

## 2025-03-07 RX ORDER — IBUPROFEN 600 MG/1
600 TABLET, FILM COATED ORAL 3 TIMES DAILY PRN
Qty: 30 TABLET | Refills: 0 | Status: SHIPPED | OUTPATIENT
Start: 2025-03-07

## 2025-03-07 ASSESSMENT — PAIN DESCRIPTION - LOCATION: LOCATION: FACE

## 2025-03-07 ASSESSMENT — PAIN SCALES - GENERAL: PAINLEVEL_OUTOF10: 2

## 2025-03-07 ASSESSMENT — PAIN DESCRIPTION - DESCRIPTORS: DESCRIPTORS: DULL

## 2025-03-07 NOTE — DISCHARGE INSTRUCTIONS
Due to having a fracture of the nasal bone with a laceration, antibiotics are needed at this time.  Nasal fractures take a few weeks to heal and can be treated outpatient only with ice, pain medication and follow-up with primary care doctor.  Please take ibuprofen every 6-8 hours for pain relief and apply ice to the nose and 30-minute intervals.

## 2025-03-07 NOTE — ED PROVIDER NOTES
Expenses: Not hard at all   Food Insecurity: No Food Insecurity (10/7/2024)    Hunger Vital Sign     Worried About Running Out of Food in the Last Year: Never true     Ran Out of Food in the Last Year: Never true   Transportation Needs: Unknown (10/7/2024)    PRAPARE - Transportation     Lack of Transportation (Non-Medical): No   Physical Activity: Sufficiently Active (4/6/2024)    Exercise Vital Sign     Days of Exercise per Week: 7 days     Minutes of Exercise per Session: 60 min   Housing Stability: Unknown (10/7/2024)    Housing Stability Vital Sign     Homeless in the Last Year: No           PHYSICAL EXAM    (up to 7 for level 4, 8 or more for level 5)     ED Triage Vitals   BP Systolic BP Percentile Diastolic BP Percentile Temp Temp src Pulse Resp SpO2   -- -- -- -- -- -- -- --      Height Weight         -- --             Body mass index is 26.5 kg/m².    Physical Exam  Constitutional:       Appearance: Normal appearance.   HENT:      Head: Normocephalic and atraumatic.        Comments: Edematous nasal bridge with 5 sutures placed.  Presenting to the left cheek with no deformities noted.  Cardiovascular:      Rate and Rhythm: Normal rate and regular rhythm.   Pulmonary:      Effort: Pulmonary effort is normal.      Breath sounds: Normal breath sounds.   Musculoskeletal:      Cervical back: Normal range of motion.   Skin:     General: Skin is warm.   Neurological:      General: No focal deficit present.      Mental Status: He is alert and oriented to person, place, and time.      Cranial Nerves: No cranial nerve deficit.      Sensory: No sensory deficit.      Motor: No weakness.      Coordination: Coordination normal.      Gait: Gait normal.   Psychiatric:         Mood and Affect: Mood normal.         DIAGNOSTIC RESULTS     EKG: All EKG's are interpreted by the Emergency Department Physician who either signs or Co-signs this chart in the absence of a cardiologist.        RADIOLOGY:   Non-plain film images such

## 2025-03-07 NOTE — TELEPHONE ENCOUNTER
Received incoming call from patient's wife.  She stated her  had a fall and think he broke his nose.  Did not know what to do.  Patient's wife was advised to take  to ER to be evaluated.

## 2025-03-07 NOTE — PATIENT INSTRUCTIONS
Please return to this facility, go to your primary care, or emergency department in 5 to 7 days for suture removal.  You had 5 sutures placed on your nasal bridge    Please go to the emergency department for CT head/facial bones due to your fall
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
## 2025-03-07 NOTE — ED TRIAGE NOTES
ED triage note: ambulatory accompanied. Patient reports 2 hours ago tripped stepping off back step with glasses on hit face on slate. States was seen at urgent care [facility] and had 5 stitches placed to bridge of nose. Denies LOC. Denies blood thinners. States takes 81 mg aspirin. Denies neck or back pain. Reports was sent over from urgent care for CT scan.

## 2025-03-12 ENCOUNTER — OFFICE VISIT (OUTPATIENT)
Age: 75
End: 2025-03-12
Payer: MEDICARE

## 2025-03-12 VITALS
DIASTOLIC BLOOD PRESSURE: 73 MMHG | WEIGHT: 188.2 LBS | TEMPERATURE: 97.3 F | OXYGEN SATURATION: 97 % | HEIGHT: 71 IN | HEART RATE: 74 BPM | RESPIRATION RATE: 16 BRPM | SYSTOLIC BLOOD PRESSURE: 127 MMHG | BODY MASS INDEX: 26.35 KG/M2

## 2025-03-12 DIAGNOSIS — S01.21XA LACERATION OF NOSE, INITIAL ENCOUNTER: Primary | ICD-10-CM

## 2025-03-12 DIAGNOSIS — Z48.02 VISIT FOR SUTURE REMOVAL: ICD-10-CM

## 2025-03-12 PROCEDURE — 1036F TOBACCO NON-USER: CPT | Performed by: NURSE PRACTITIONER

## 2025-03-12 PROCEDURE — G8427 DOCREV CUR MEDS BY ELIG CLIN: HCPCS | Performed by: NURSE PRACTITIONER

## 2025-03-12 PROCEDURE — G8419 CALC BMI OUT NRM PARAM NOF/U: HCPCS | Performed by: NURSE PRACTITIONER

## 2025-03-12 PROCEDURE — 99213 OFFICE O/P EST LOW 20 MIN: CPT | Performed by: NURSE PRACTITIONER

## 2025-03-12 PROCEDURE — 1126F AMNT PAIN NOTED NONE PRSNT: CPT | Performed by: NURSE PRACTITIONER

## 2025-03-12 PROCEDURE — 1160F RVW MEDS BY RX/DR IN RCRD: CPT | Performed by: NURSE PRACTITIONER

## 2025-03-12 PROCEDURE — 1123F ACP DISCUSS/DSCN MKR DOCD: CPT | Performed by: NURSE PRACTITIONER

## 2025-03-12 PROCEDURE — 1159F MED LIST DOCD IN RCRD: CPT | Performed by: NURSE PRACTITIONER

## 2025-03-12 PROCEDURE — 3017F COLORECTAL CA SCREEN DOC REV: CPT | Performed by: NURSE PRACTITIONER

## 2025-03-12 SDOH — ECONOMIC STABILITY: FOOD INSECURITY: WITHIN THE PAST 12 MONTHS, THE FOOD YOU BOUGHT JUST DIDN'T LAST AND YOU DIDN'T HAVE MONEY TO GET MORE.: NEVER TRUE

## 2025-03-12 SDOH — ECONOMIC STABILITY: FOOD INSECURITY: WITHIN THE PAST 12 MONTHS, YOU WORRIED THAT YOUR FOOD WOULD RUN OUT BEFORE YOU GOT MONEY TO BUY MORE.: NEVER TRUE

## 2025-03-12 ASSESSMENT — PATIENT HEALTH QUESTIONNAIRE - PHQ9
1. LITTLE INTEREST OR PLEASURE IN DOING THINGS: NOT AT ALL
SUM OF ALL RESPONSES TO PHQ QUESTIONS 1-9: 0
2. FEELING DOWN, DEPRESSED OR HOPELESS: NOT AT ALL

## 2025-03-12 NOTE — PROGRESS NOTES
Rivera Pham (:  1950) is a 74 y.o. male,here for evaluation of the following chief complaint(s):  Suture / Staple Removal (Nose )  /73   Pulse 74   Temp 97.3 °F (36.3 °C) (Temporal)   Resp 16   Ht 1.803 m (5' 11\")   Wt 85.4 kg (188 lb 3.2 oz)   SpO2 97%   BMI 26.25 kg/m²         SUBJECTIVE/OBJECTIVE:    HPI:Patient presents for suture removal of nasal bridge laceration. He had a misstep and fell forward onto slate on 3/7/35. He went to urgent care and had 5 sutures placed. He did have small possible fracture of nasal tip. He was placed on keflex. Tetanus is up to date. Wound is healing well. No bleeding.    Review of Systems   Skin:  Positive for wound.       Physical Exam  Constitutional:       Appearance: Normal appearance.   Skin:     Comments: 2 cm healing laceration of nasal bridge; bruising noted; well-approximated with 5 sutures; sutures easily removed; mild bleeding at suture insertion site. Wound is still well approximated with scab   Neurological:      General: No focal deficit present.      Mental Status: He is alert and oriented to person, place, and time.   Psychiatric:         Mood and Affect: Mood normal.         Behavior: Behavior normal.          ASSESSMENT/PLAN:  1. Laceration of nose, initial encounter  2. Visit for suture removal  Apply aquaphor after scab falls off  Keep clean and dry otherwise    --MANSI Bentley - NP

## 2025-04-10 RX ORDER — LISINOPRIL 40 MG/1
40 TABLET ORAL DAILY
Qty: 90 TABLET | Refills: 1 | Status: SHIPPED | OUTPATIENT
Start: 2025-04-10

## 2025-04-10 NOTE — TELEPHONE ENCOUNTER
Last office visit 3/12/25  Next Appt  With Internal Medicine (Karlie Membreno MD)  04/21/2025 at 9:20 AM    Last fill on lisinopril 10/10/24 #90 with 1 additional refill

## 2025-04-16 SDOH — HEALTH STABILITY: PHYSICAL HEALTH: ON AVERAGE, HOW MANY DAYS PER WEEK DO YOU ENGAGE IN MODERATE TO STRENUOUS EXERCISE (LIKE A BRISK WALK)?: 7 DAYS

## 2025-04-16 SDOH — HEALTH STABILITY: PHYSICAL HEALTH: ON AVERAGE, HOW MANY MINUTES DO YOU ENGAGE IN EXERCISE AT THIS LEVEL?: 60 MIN

## 2025-04-16 ASSESSMENT — PATIENT HEALTH QUESTIONNAIRE - PHQ9
SUM OF ALL RESPONSES TO PHQ QUESTIONS 1-9: 0
SUM OF ALL RESPONSES TO PHQ QUESTIONS 1-9: 0
2. FEELING DOWN, DEPRESSED OR HOPELESS: NOT AT ALL
1. LITTLE INTEREST OR PLEASURE IN DOING THINGS: NOT AT ALL
SUM OF ALL RESPONSES TO PHQ QUESTIONS 1-9: 0
SUM OF ALL RESPONSES TO PHQ QUESTIONS 1-9: 0

## 2025-04-16 ASSESSMENT — LIFESTYLE VARIABLES
HOW MANY STANDARD DRINKS CONTAINING ALCOHOL DO YOU HAVE ON A TYPICAL DAY: 0
HOW OFTEN DO YOU HAVE A DRINK CONTAINING ALCOHOL: 1
HOW OFTEN DO YOU HAVE SIX OR MORE DRINKS ON ONE OCCASION: 1
HOW OFTEN DO YOU HAVE A DRINK CONTAINING ALCOHOL: NEVER
HOW MANY STANDARD DRINKS CONTAINING ALCOHOL DO YOU HAVE ON A TYPICAL DAY: PATIENT DOES NOT DRINK

## 2025-04-21 ENCOUNTER — OFFICE VISIT (OUTPATIENT)
Age: 75
End: 2025-04-21
Payer: MEDICARE

## 2025-04-21 VITALS
DIASTOLIC BLOOD PRESSURE: 60 MMHG | HEART RATE: 65 BPM | TEMPERATURE: 97.1 F | SYSTOLIC BLOOD PRESSURE: 110 MMHG | RESPIRATION RATE: 16 BRPM | WEIGHT: 182.4 LBS | OXYGEN SATURATION: 97 % | HEIGHT: 71 IN | BODY MASS INDEX: 25.54 KG/M2

## 2025-04-21 DIAGNOSIS — M25.512 CHRONIC LEFT SHOULDER PAIN: ICD-10-CM

## 2025-04-21 DIAGNOSIS — R73.01 IMPAIRED FASTING GLUCOSE: ICD-10-CM

## 2025-04-21 DIAGNOSIS — C61 MALIGNANT NEOPLASM OF PROSTATE (HCC): ICD-10-CM

## 2025-04-21 DIAGNOSIS — G89.29 CHRONIC LEFT SHOULDER PAIN: ICD-10-CM

## 2025-04-21 DIAGNOSIS — E78.2 MIXED HYPERLIPIDEMIA: ICD-10-CM

## 2025-04-21 DIAGNOSIS — M54.50 CHRONIC LOW BACK PAIN WITHOUT SCIATICA, UNSPECIFIED BACK PAIN LATERALITY: ICD-10-CM

## 2025-04-21 DIAGNOSIS — I10 ESSENTIAL (PRIMARY) HYPERTENSION: ICD-10-CM

## 2025-04-21 DIAGNOSIS — Z00.00 MEDICARE ANNUAL WELLNESS VISIT, SUBSEQUENT: Primary | ICD-10-CM

## 2025-04-21 DIAGNOSIS — Z79.899 ENCOUNTER FOR LONG-TERM (CURRENT) USE OF MEDICATIONS: ICD-10-CM

## 2025-04-21 DIAGNOSIS — G89.29 CHRONIC LOW BACK PAIN WITHOUT SCIATICA, UNSPECIFIED BACK PAIN LATERALITY: ICD-10-CM

## 2025-04-21 LAB
ALBUMIN SERPL-MCNC: 4 G/DL (ref 3.5–5)
ALBUMIN/GLOB SERPL: 1.4 (ref 1.1–2.2)
ALP SERPL-CCNC: 40 U/L (ref 45–117)
ALT SERPL-CCNC: 24 U/L (ref 12–78)
ANION GAP SERPL CALC-SCNC: 4 MMOL/L (ref 2–12)
AST SERPL-CCNC: 12 U/L (ref 15–37)
BASOPHILS # BLD: 0.01 K/UL (ref 0–0.1)
BASOPHILS NFR BLD: 0.2 % (ref 0–1)
BILIRUB SERPL-MCNC: 0.5 MG/DL (ref 0.2–1)
BUN SERPL-MCNC: 18 MG/DL (ref 6–20)
BUN/CREAT SERPL: 21 (ref 12–20)
CALCIUM SERPL-MCNC: 9.5 MG/DL (ref 8.5–10.1)
CHLORIDE SERPL-SCNC: 104 MMOL/L (ref 97–108)
CHOLEST SERPL-MCNC: 104 MG/DL
CO2 SERPL-SCNC: 30 MMOL/L (ref 21–32)
CREAT SERPL-MCNC: 0.85 MG/DL (ref 0.7–1.3)
DIFFERENTIAL METHOD BLD: ABNORMAL
EOSINOPHIL # BLD: 0.11 K/UL (ref 0–0.4)
EOSINOPHIL NFR BLD: 2.5 % (ref 0–7)
ERYTHROCYTE [DISTWIDTH] IN BLOOD BY AUTOMATED COUNT: 12.2 % (ref 11.5–14.5)
EST. AVERAGE GLUCOSE BLD GHB EST-MCNC: 105 MG/DL
GLOBULIN SER CALC-MCNC: 2.9 G/DL (ref 2–4)
GLUCOSE SERPL-MCNC: 109 MG/DL (ref 65–100)
HBA1C MFR BLD: 5.3 % (ref 4–5.6)
HCT VFR BLD AUTO: 39.2 % (ref 36.6–50.3)
HDLC SERPL-MCNC: 39 MG/DL
HDLC SERPL: 2.7 (ref 0–5)
HGB BLD-MCNC: 12.6 G/DL (ref 12.1–17)
IMM GRANULOCYTES # BLD AUTO: 0.01 K/UL (ref 0–0.04)
IMM GRANULOCYTES NFR BLD AUTO: 0.2 % (ref 0–0.5)
LDLC SERPL CALC-MCNC: 49.8 MG/DL (ref 0–100)
LYMPHOCYTES # BLD: 0.65 K/UL (ref 0.8–3.5)
LYMPHOCYTES NFR BLD: 14.8 % (ref 12–49)
MCH RBC QN AUTO: 31 PG (ref 26–34)
MCHC RBC AUTO-ENTMCNC: 32.1 G/DL (ref 30–36.5)
MCV RBC AUTO: 96.3 FL (ref 80–99)
MONOCYTES # BLD: 0.51 K/UL (ref 0–1)
MONOCYTES NFR BLD: 11.6 % (ref 5–13)
NEUTS SEG # BLD: 3.11 K/UL (ref 1.8–8)
NEUTS SEG NFR BLD: 70.7 % (ref 32–75)
NRBC # BLD: 0 K/UL (ref 0–0.01)
NRBC BLD-RTO: 0 PER 100 WBC
PLATELET # BLD AUTO: 168 K/UL (ref 150–400)
PMV BLD AUTO: 10.7 FL (ref 8.9–12.9)
POTASSIUM SERPL-SCNC: 4.6 MMOL/L (ref 3.5–5.1)
PROT SERPL-MCNC: 6.9 G/DL (ref 6.4–8.2)
RBC # BLD AUTO: 4.07 M/UL (ref 4.1–5.7)
RBC MORPH BLD: ABNORMAL
SODIUM SERPL-SCNC: 138 MMOL/L (ref 136–145)
TRIGL SERPL-MCNC: 76 MG/DL
VLDLC SERPL CALC-MCNC: 15.2 MG/DL
WBC # BLD AUTO: 4.4 K/UL (ref 4.1–11.1)
WBC MORPH BLD: ABNORMAL

## 2025-04-21 PROCEDURE — 99214 OFFICE O/P EST MOD 30 MIN: CPT | Performed by: INTERNAL MEDICINE

## 2025-04-21 NOTE — PROGRESS NOTES
Medicare Annual Wellness Visit    Rivera Pham is here for Medicare AWV    Assessment & Plan  Medicare Wellness Exam  -has an up to date ACP on file  -[facility] decision maker reviewed with patient and updated.    -up to date with colon cancer screening and immunizations.     2. Prostate cancer: Stable.  - PSA levels consistently low, positive response to hormonal therapy. Has completed treatment. On surveillance with Dr. Echavarria and Dr. Clifford.  - Continue monitoring PSA every 3 months, alternating between Dr. Brumfield and Dr. Clifford.  - Frequency of checks may reduce to every 6 months if levels remain low.  - Continue hormonal therapy per oncologist's recommendations.    3. Back pain: Chronic.  - Likely due to arthritis in lower back, causing muscle weakness and difficulty with daily activities.  - Referral for physical therapy for shoulder and back pain.  - Advised daily exercises to strengthen back muscles and improve endurance.    4. Rotator cuff injury.  - Physical therapy to address rotator cuff issues in left shoulder.  - Advised to follow prescribed exercises to maintain muscle tone and prevent further injury.  - Referral for physical therapy includes shoulder exercises to improve function and reduce pain.  - Emphasis on long-term exercise regimen to manage arthritis and prevent recurrence.    5. hypertension   -controlled with use of lisinopril 40mg daily    6. hyperlipidemia   -taking atorvastatin 40mg daily  -need to maintain diet low in fats and cholesterol     7. IFG  -Maintain diet low in sugar and carbohydrates    Orders Placed This Encounter    CBC with Auto Differential     Standing Status:   Future     Number of Occurrences:   1     Expected Date:   4/21/2025     Expiration Date:   4/21/2026    Comprehensive Metabolic Panel     Standing Status:   Future     Number of Occurrences:   1     Expected Date:   4/21/2025     Expiration Date:   4/21/2026    Hemoglobin A1C     Standing Status:   Future

## 2025-04-21 NOTE — PROGRESS NOTES
Chief Complaint   Patient presents with    Medicare AWV     eviewed record in preparation for visit and have obtained necessary documentation.    Identified pt with two pt identifiers(name and ).      Health Maintenance Due   Topic    COVID-19 Vaccine (8 - Pfizer risk  season)    Annual Wellness Visit (Medicare)     A1C test (Diabetic or Prediabetic)     Lipids          Chief Complaint   Patient presents with    Medicare AWV        Wt Readings from Last 3 Encounters:   25 82.7 kg (182 lb 6.4 oz)   25 85.4 kg (188 lb 3.2 oz)   25 86.2 kg (190 lb 0.6 oz)     Temp Readings from Last 3 Encounters:   25 97.1 °F (36.2 °C) (Temporal)   25 97.3 °F (36.3 °C) (Temporal)   25 98.2 °F (36.8 °C) (Oral)     BP Readings from Last 3 Encounters:   25 110/60   25 127/73   25 135/71     Pulse Readings from Last 3 Encounters:   25 65   25 74   25 59           Learning Assessment:  :    Failed to redirect to the Timeline version of the REVFS SmartLink.    Depression Screening:  :         No data to display                Fall Risk Assessment:  :    Failed to redirect to the Timeline version of the REVFS SmartLink.    Abuse Screening:  :         No data to display

## 2025-04-22 ENCOUNTER — RESULTS FOLLOW-UP (OUTPATIENT)
Age: 75
End: 2025-04-22

## 2025-05-11 DIAGNOSIS — E78.2 MIXED HYPERLIPIDEMIA: ICD-10-CM

## 2025-05-12 RX ORDER — ATORVASTATIN CALCIUM 40 MG/1
40 TABLET, FILM COATED ORAL NIGHTLY
Qty: 90 TABLET | Refills: 1 | Status: SHIPPED | OUTPATIENT
Start: 2025-05-12

## 2025-05-12 NOTE — TELEPHONE ENCOUNTER
Rx sent to pharmacy as previously filled and verified by Verbal Order Read Back with provider.    NV 10/21/2025

## 2025-07-07 ENCOUNTER — PATIENT MESSAGE (OUTPATIENT)
Age: 75
End: 2025-07-07

## 2025-07-07 RX ORDER — LISINOPRIL 20 MG/1
20 TABLET ORAL DAILY
Qty: 90 TABLET | Refills: 1 | Status: SHIPPED | OUTPATIENT
Start: 2025-07-07

## (undated) DEVICE — ELECTRODE PT RET AD L9FT HI MOIST COND ADH HYDRGEL CORDED